# Patient Record
Sex: MALE | Race: WHITE | Employment: FULL TIME | URBAN - METROPOLITAN AREA
[De-identification: names, ages, dates, MRNs, and addresses within clinical notes are randomized per-mention and may not be internally consistent; named-entity substitution may affect disease eponyms.]

---

## 2022-08-22 ENCOUNTER — HOSPITAL ENCOUNTER (EMERGENCY)
Age: 57
Discharge: HOME | End: 2022-08-22

## 2022-08-22 ENCOUNTER — APPOINTMENT (OUTPATIENT)
Dept: GENERAL RADIOLOGY | Age: 57
End: 2022-08-22

## 2022-08-22 VITALS
HEART RATE: 71 BPM | OXYGEN SATURATION: 99 % | SYSTOLIC BLOOD PRESSURE: 134 MMHG | BODY MASS INDEX: 24.49 KG/M2 | WEIGHT: 197 LBS | DIASTOLIC BLOOD PRESSURE: 96 MMHG | TEMPERATURE: 98.3 F | RESPIRATION RATE: 18 BRPM | HEIGHT: 75 IN

## 2022-08-22 DIAGNOSIS — S61.012A LACERATION OF LEFT THUMB WITHOUT FOREIGN BODY WITHOUT DAMAGE TO NAIL, INITIAL ENCOUNTER: ICD-10-CM

## 2022-08-22 DIAGNOSIS — S61.213A LACERATION OF LEFT MIDDLE FINGER WITHOUT FOREIGN BODY WITHOUT DAMAGE TO NAIL, INITIAL ENCOUNTER: ICD-10-CM

## 2022-08-22 DIAGNOSIS — S61.311A LACERATION OF LEFT INDEX FINGER WITHOUT FOREIGN BODY WITH DAMAGE TO NAIL, INITIAL ENCOUNTER: Primary | ICD-10-CM

## 2022-08-22 PROCEDURE — 12002 RPR S/N/AX/GEN/TRNK2.6-7.5CM: CPT

## 2022-08-22 PROCEDURE — 99283 EMERGENCY DEPT VISIT LOW MDM: CPT

## 2022-08-22 PROCEDURE — 73120 X-RAY EXAM OF HAND: CPT

## 2022-08-22 RX ORDER — CEPHALEXIN 250 MG/1
250 CAPSULE ORAL 4 TIMES DAILY
Qty: 20 CAPSULE | Refills: 0 | Status: SHIPPED | OUTPATIENT
Start: 2022-08-22 | End: 2022-08-27

## 2022-08-22 ASSESSMENT — ENCOUNTER SYMPTOMS
PHOTOPHOBIA: 0
BACK PAIN: 0
COUGH: 0
ABDOMINAL PAIN: 0

## 2022-08-22 ASSESSMENT — PAIN DESCRIPTION - DESCRIPTORS: DESCRIPTORS: SORE

## 2022-08-22 ASSESSMENT — PAIN - FUNCTIONAL ASSESSMENT
PAIN_FUNCTIONAL_ASSESSMENT: 0-10
PAIN_FUNCTIONAL_ASSESSMENT: 0-10

## 2022-08-22 ASSESSMENT — PAIN DESCRIPTION - ORIENTATION: ORIENTATION: LEFT

## 2022-08-22 ASSESSMENT — PAIN DESCRIPTION - PAIN TYPE: TYPE: ACUTE PAIN

## 2022-08-22 ASSESSMENT — PAIN DESCRIPTION - FREQUENCY: FREQUENCY: INTERMITTENT

## 2022-08-22 ASSESSMENT — PAIN SCALES - GENERAL
PAINLEVEL_OUTOF10: 4
PAINLEVEL_OUTOF10: 0

## 2022-08-22 ASSESSMENT — PAIN DESCRIPTION - LOCATION: LOCATION: FINGER (COMMENT WHICH ONE)

## 2022-08-22 NOTE — ED PROVIDER NOTES
[facility] ED  eMERGENCY dEPARTMENT eNCOUnter      Pt Name: Chadd Grier  MRN: [id number]  [place] 1965  Date of evaluation: 8/22/2022  Provider: Dionna Paez PA-C      HISTORY OF PRESENT ILLNESS    HPI  Chadd rGier is a 59 y.o. male, who presents to the ED with CC of finger laceration to L 2nd and 3rd digits while working on a fan blade. He reports bleeding fairly controlled but believes he may need stitches. Denies loss of movement to any portions of fingers. UTD on tetanus immunization, per patient. REVIEW OF SYSTEMS     Review of Systems   Constitutional:  Negative for fever. HENT:  Negative for ear pain. Eyes:  Negative for photophobia. Respiratory:  Negative for cough. Cardiovascular:  Negative for chest pain. Gastrointestinal:  Negative for abdominal pain. Genitourinary:  Negative for flank pain. Musculoskeletal:  Negative for back pain. Skin:  Positive for wound. Neurological:  Negative for headaches. Hematological:  Negative for adenopathy. Psychiatric/Behavioral:  Negative for confusion. PAST MEDICAL HISTORY   History reviewed. No pertinent past medical history. SURGICAL HISTORY     History reviewed. No pertinent surgical history. CURRENT MEDICATIONS       Previous Medications    No medications on file       ALLERGIES     Patient has no known allergies. FAMILY HISTORY     History reviewed. No pertinent family history.      SOCIAL HISTORY       Social History     Socioeconomic History    Marital status: Single     Spouse name: None    Number of children: None    Years of education: None    Highest education level: None   Tobacco Use    Smoking status: Every Day     Types: Cigarettes    Smokeless tobacco: Never   Substance and Sexual Activity    Alcohol use: Yes    Drug use: Not Currently    Sexual activity: Not Currently       PHYSICAL EXAM       ED Triage Vitals [08/22/22 1529]   BP Temp Temp src Pulse Resp SpO2 Height Weight   (!) 134/96 -- -- -- 18 99 % 6' 3\" (1.905 m) 197 lb (89.4 kg)       Physical Exam  Vitals and nursing note reviewed. Constitutional:       Appearance: He is not toxic-appearing. HENT:      Head: Normocephalic and atraumatic. Eyes:      Pupils: Pupils are equal, round, and reactive to light. Cardiovascular:      Rate and Rhythm: Normal rate. Pulmonary:      Effort: Pulmonary effort is normal.   Abdominal:      General: There is no distension. Musculoskeletal:      Cervical back: Neck supple. Comments: DEANNE x4   Skin:     Capillary Refill: Capillary refill takes less than 2 seconds. Comments:   Wound #1: ~2 cm  curvilinear laceration to L 2nd digit on dorsal aspect over DIP to cuticle  Wound #2: ~1 cm curvilinear laceration to L 3rd digit on dorsal aspect near DIP  Wound #3: Subcentimeter linear laceration to thumb tip without gaping or active bleeding   Neurological:      General: No focal deficit present. Mental Status: He is alert and oriented to person, place, and time.    Psychiatric:         Mood and Affect: Mood normal.       Lac Repair    Date/Time: 8/22/2022 4:23 PM  Performed by: Cindy Hill PA-C  Authorized by: Cindy Hill PA-C     Consent:     Consent obtained:  Verbal    Consent given by:  Patient    Risks, benefits, and alternatives were discussed: yes      Risks discussed:  Infection, pain, tendon damage, vascular damage and poor wound healing    Alternatives discussed:  No treatment  Anesthesia:     Anesthesia method:  Local infiltration    Local anesthetic:  Lidocaine 1% w/o epi  Laceration details:     Location:  Finger    Finger location:  L index finger    Length (cm):  2  Pre-procedure details:     Preparation:  Patient was prepped and draped in usual sterile fashion  Exploration:     Hemostasis achieved with:  Direct pressure    Imaging obtained: x-ray      Imaging outcome: foreign body not noted      Wound exploration: wound explored through full range of motion and entire depth of wound visualized    Treatment:     Area cleansed with:  Chlorhexidine    Amount of cleaning:  Standard    Irrigation solution:  Sterile saline    Debridement:  Minimal    Undermining:  None  Skin repair:     Repair method:  Sutures    Suture size:  5-0    Suture material:  Nylon    Suture technique:  Simple interrupted    Number of sutures:  5  Approximation:     Approximation:  Close  Repair type:     Repair type:   Intermediate  Post-procedure details:     Dressing:  Splint for protection, bulky dressing and antibiotic ointment    Procedure completion:  Tolerated well, no immediate complications  Lac Repair    Date/Time: 8/22/2022 4:25 PM  Performed by: Dionna Paez PA-C  Authorized by: Dionna Paez PA-C     Consent:     Consent obtained:  Verbal    Consent given by:  Patient    Risks, benefits, and alternatives were discussed: yes      Risks discussed:  Infection, pain, tendon damage, vascular damage and poor wound healing    Alternatives discussed:  No treatment  Anesthesia:     Anesthesia method:  Local infiltration    Local anesthetic:  Lidocaine 1% w/o epi  Laceration details:     Location:  Finger    Finger location:  L long finger    Length (cm):  1.3  Pre-procedure details:     Preparation:  Patient was prepped and draped in usual sterile fashion  Exploration:     Hemostasis achieved with:  Direct pressure    Imaging obtained: x-ray      Imaging outcome: foreign body not noted      Wound exploration: wound explored through full range of motion and entire depth of wound visualized    Treatment:     Area cleansed with:  Chlorhexidine    Amount of cleaning:  Standard    Irrigation solution:  Sterile saline    Debridement:  None    Undermining:  None  Skin repair:     Repair method:  Sutures    Suture size:  5-0    Suture material:  Nylon    Suture technique:  Simple interrupted    Number of sutures:  3  Approximation:     Approximation:  Close  Repair type:     Repair type: Simple  Post-procedure details:     Dressing:  Antibiotic ointment, splint for protection and bulky dressing    Procedure completion:  Tolerated well, no immediate complications    MDM:   Vitals:    Vitals:    08/22/22 1529   BP: (!) 134/96   Resp: 18   SpO2: 99%   Weight: 197 lb (89.4 kg)   Height: 6' 3\" (1.905 m)       Patient presents to ED for complaint, as described above. Imaging obtained, negative for fracture or FB. Per patient, UTD on tetanus. Lac repair performed per procedure notes above. Keflex prescribed for infection prophylaxis. All questions answered at the time of visit. Standard anticipatory guidance given to patient upon discharge. I have given them a specific time frame in which to follow-up and who to follow-up with. I have also advised them that they should return to the emergency department if they get worse, or not getting better or develop any new or concerning symptoms. Patient demonstrates understanding. FINAL IMPRESSION      1. Laceration of left index finger without foreign body with damage to nail, initial encounter    2. Laceration of left middle finger without foreign body without damage to nail, initial encounter    3.  Laceration of left thumb without foreign body without damage to nail, initial encounter          DISPOSITION/PLAN   DISPOSITION Decision To Discharge 08/22/2022 04:18:50 PM      PATIENT REFERRED TO:  [address]  [phone]  In 2 days      DISCHARGE MEDICATIONS:  New Prescriptions    CEPHALEXIN (KEFLEX) 250 MG CAPSULE    Take 1 capsule by mouth 4 times daily for 5 days            Kaylee Colón PA-C (electronically signed)  Emergency Physician Assistant       Kaylee Colón PA-C  08/22/22 9198

## 2022-08-22 NOTE — ED NOTES
Wounds cleaned of blood, bandaides applied over sutured area. Finger splints applied to fingers and fingers wrapped with gauze. Pt oneil well.   Pt advised of continued care for sutured area and need for f/u in week for suture removal.     Clement Gray RN  08/22/22 0394

## 2023-08-14 ENCOUNTER — OFFICE VISIT (OUTPATIENT)
Dept: PRIMARY CARE | Facility: CLINIC | Age: 58
End: 2023-08-14
Payer: COMMERCIAL

## 2023-08-14 ENCOUNTER — TELEPHONE (OUTPATIENT)
Dept: PRIMARY CARE | Facility: CLINIC | Age: 58
End: 2023-08-14

## 2023-08-14 VITALS
WEIGHT: 190.5 LBS | BODY MASS INDEX: 23.69 KG/M2 | DIASTOLIC BLOOD PRESSURE: 62 MMHG | HEART RATE: 64 BPM | HEIGHT: 75 IN | SYSTOLIC BLOOD PRESSURE: 120 MMHG | OXYGEN SATURATION: 95 %

## 2023-08-14 DIAGNOSIS — R09.89 BILATERAL CAROTID BRUITS: Primary | ICD-10-CM

## 2023-08-14 DIAGNOSIS — J44.9 CHRONIC OBSTRUCTIVE PULMONARY DISEASE, UNSPECIFIED COPD TYPE (MULTI): ICD-10-CM

## 2023-08-14 DIAGNOSIS — F17.298 OTHER TOBACCO PRODUCT NICOTINE DEPENDENCE WITH OTHER NICOTINE-INDUCED DISORDER: ICD-10-CM

## 2023-08-14 DIAGNOSIS — E78.2 HYPERLIPEMIA, MIXED: ICD-10-CM

## 2023-08-14 PROBLEM — F17.200 NICOTINE DEPENDENCE: Status: ACTIVE | Noted: 2023-08-14

## 2023-08-14 PROCEDURE — 99214 OFFICE O/P EST MOD 30 MIN: CPT | Performed by: NURSE PRACTITIONER

## 2023-08-14 PROCEDURE — 4004F PT TOBACCO SCREEN RCVD TLK: CPT | Performed by: NURSE PRACTITIONER

## 2023-08-14 PROCEDURE — 96127 BRIEF EMOTIONAL/BEHAV ASSMT: CPT | Performed by: NURSE PRACTITIONER

## 2023-08-14 RX ORDER — TIOTROPIUM BROMIDE AND OLODATEROL 3.124; 2.736 UG/1; UG/1
2 SPRAY, METERED RESPIRATORY (INHALATION) DAILY
Qty: 1 G | Refills: 11 | Status: SHIPPED | OUTPATIENT
Start: 2023-08-14

## 2023-08-14 RX ORDER — FLUTICASONE FUROATE AND VILANTEROL 200; 25 UG/1; UG/1
1 POWDER RESPIRATORY (INHALATION) DAILY
COMMUNITY
Start: 2021-03-09 | End: 2023-08-14 | Stop reason: SDUPTHER

## 2023-08-14 RX ORDER — FLUTICASONE FUROATE AND VILANTEROL 200; 25 UG/1; UG/1
1 POWDER RESPIRATORY (INHALATION) DAILY
Qty: 1 EACH | Refills: 11 | Status: SHIPPED | OUTPATIENT
Start: 2023-08-14 | End: 2023-08-14

## 2023-08-14 RX ORDER — ATORVASTATIN CALCIUM 10 MG/1
10 TABLET, FILM COATED ORAL DAILY
Qty: 30 TABLET | Refills: 11 | Status: SHIPPED | OUTPATIENT
Start: 2023-08-14 | End: 2024-08-13

## 2023-08-14 ASSESSMENT — ENCOUNTER SYMPTOMS
FEVER: 0
PALPITATIONS: 0
LIGHT-HEADEDNESS: 0
FATIGUE: 0
WHEEZING: 0
HEADACHES: 1
SHORTNESS OF BREATH: 1
UNEXPECTED WEIGHT CHANGE: 0
BRUISES/BLEEDS EASILY: 1
ABDOMINAL PAIN: 0
DIZZINESS: 0
COUGH: 1
CHILLS: 0

## 2023-08-14 ASSESSMENT — PATIENT HEALTH QUESTIONNAIRE - PHQ9
2. FEELING DOWN, DEPRESSED OR HOPELESS: NOT AT ALL
SUM OF ALL RESPONSES TO PHQ9 QUESTIONS 1 AND 2: 0
1. LITTLE INTEREST OR PLEASURE IN DOING THINGS: NOT AT ALL

## 2023-08-14 NOTE — PROGRESS NOTES
"Subjective   Patient ID: Mo Cotton is a 58 y.o. male who presents for Med Refill (Inhaler refill ).    Mo comes to the office for a refill on his inhaler.  Last seen in office 2021.  States lost his job and insurance until recently.  PHQ 2 score: 0  + COPD. Works in [employer] and breathing more problematic over last yr. Tobacco use: cigars 3/week.  He is going to try cutting back and reducing the amount of cigars he is smoking weekly.  Cough productive (more @ night) & difficult to expectorate. More SOB. No wheezing.   Continues to take ASA 81mg, but when lost insurance he ran out of his a atorvastatin.  History of R external carotid artery stenosis; was recommended to have a recheck of carotid Dopplers in 1 year.  At  Declines colorectal cancer screening at this visit  I have counselled patient about need for smoking/tobacco cessation and how I can support efforts when patient is ready to quit. Patient currently has signs or symptoms of tobacco related disease.              Review of Systems   Constitutional:  Negative for chills, fatigue, fever and unexpected weight change.   Respiratory:  Positive for cough and shortness of breath. Negative for wheezing.    Cardiovascular:  Negative for chest pain, palpitations and leg swelling.   Gastrointestinal:  Negative for abdominal pain.   Neurological:  Positive for headaches. Negative for dizziness, syncope and light-headedness.   Hematological:  Bruises/bleeds easily.       Objective   /62   Pulse 64   Ht 1.905 m (6' 3\")   Wt 86.4 kg (190 lb 8 oz)   SpO2 95%   BMI 23.81 kg/m²     Physical Exam  Vitals and nursing note reviewed.   Constitutional:       Appearance: Normal appearance.   HENT:      Head: Normocephalic.   Neck:      Vascular: Carotid bruit present.   Cardiovascular:      Rate and Rhythm: Normal rate and regular rhythm.      Heart sounds: Normal heart sounds.   Pulmonary:      Effort: Pulmonary effort is normal.      Breath sounds: Decreased air " movement present.   Abdominal:      General: Abdomen is flat. Bowel sounds are normal.      Palpations: Abdomen is soft.      Tenderness: There is no abdominal tenderness.   Musculoskeletal:      Right lower leg: No edema.      Left lower leg: No edema.   Skin:     General: Skin is warm and dry.   Neurological:      General: No focal deficit present.      Mental Status: He is alert and oriented to person, place, and time.   Psychiatric:         Mood and Affect: Mood normal.         Thought Content: Thought content normal.         Assessment/Plan   Problem List Items Addressed This Visit       Bilateral carotid bruits - Primary    Relevant Orders    Vascular US carotid artery duplex bilateral    Follow Up In Primary Care - Established    COPD (chronic obstructive pulmonary disease) (CMS/[facility])    Relevant Medications    tiotropium-olodateroL (Stiolto Respimat) 2.5-2.5 mcg/actuation mist inhaler    Other Relevant Orders    Follow Up In Primary Care - Established    Hyperlipemia, mixed    Relevant Medications    atorvastatin (Lipitor) 10 mg tablet    Other Relevant Orders    Lipid Panel    CBC    Comprehensive Metabolic Panel    Follow Up In Primary Care - Established    Nicotine dependence

## 2023-08-14 NOTE — PATIENT INSTRUCTIONS
Office visit in 6 weeks  Complete blood work fasting within the next 1 to 2 weeks  Continue aspirin  ReStart atorvastatin 10 mg daily  Add Mucinex  mg daily  Add stiolto 2.5mcg inhaler for COPD  Recommend cutting back on amount of alcohol and nicotine

## 2023-08-25 ENCOUNTER — TELEPHONE (OUTPATIENT)
Dept: PRIMARY CARE | Facility: CLINIC | Age: 58
End: 2023-08-25

## 2023-08-25 ENCOUNTER — LAB (OUTPATIENT)
Dept: LAB | Facility: LAB | Age: 58
End: 2023-08-25
Payer: COMMERCIAL

## 2023-08-25 DIAGNOSIS — E78.2 HYPERLIPEMIA, MIXED: ICD-10-CM

## 2023-08-25 LAB
ALANINE AMINOTRANSFERASE (SGPT) (U/L) IN SER/PLAS: 20 U/L (ref 10–52)
ALBUMIN (G/DL) IN SER/PLAS: 3.9 G/DL (ref 3.4–5)
ALKALINE PHOSPHATASE (U/L) IN SER/PLAS: 89 U/L (ref 33–136)
ANION GAP IN SER/PLAS: 9 MMOL/L (ref 10–20)
ASPARTATE AMINOTRANSFERASE (SGOT) (U/L) IN SER/PLAS: 13 U/L (ref 9–39)
BILIRUBIN TOTAL (MG/DL) IN SER/PLAS: 0.6 MG/DL (ref 0–1.2)
CALCIUM (MG/DL) IN SER/PLAS: 9 MG/DL (ref 8.6–10.3)
CARBON DIOXIDE, TOTAL (MMOL/L) IN SER/PLAS: 26 MMOL/L (ref 21–32)
CHLORIDE (MMOL/L) IN SER/PLAS: 108 MMOL/L (ref 98–107)
CHOLESTEROL (MG/DL) IN SER/PLAS: 153 MG/DL (ref 0–199)
CHOLESTEROL IN HDL (MG/DL) IN SER/PLAS: 63 MG/DL
CHOLESTEROL/HDL RATIO: 2.4
CREATININE (MG/DL) IN SER/PLAS: 0.7 MG/DL (ref 0.5–1.3)
ERYTHROCYTE DISTRIBUTION WIDTH (RATIO) BY AUTOMATED COUNT: 12.4 % (ref 11.5–14.5)
ERYTHROCYTE MEAN CORPUSCULAR HEMOGLOBIN CONCENTRATION (G/DL) BY AUTOMATED: 32.2 G/DL (ref 32–36)
ERYTHROCYTE MEAN CORPUSCULAR VOLUME (FL) BY AUTOMATED COUNT: 95 FL (ref 80–100)
ERYTHROCYTES (10*6/UL) IN BLOOD BY AUTOMATED COUNT: 5.21 X10E12/L (ref 4.5–5.9)
GFR MALE: >90 ML/MIN/1.73M2
GLUCOSE (MG/DL) IN SER/PLAS: 98 MG/DL (ref 74–99)
HEMATOCRIT (%) IN BLOOD BY AUTOMATED COUNT: 49.7 % (ref 41–52)
HEMOGLOBIN (G/DL) IN BLOOD: 16 G/DL (ref 13.5–17.5)
LDL: 72 MG/DL (ref 0–99)
LEUKOCYTES (10*3/UL) IN BLOOD BY AUTOMATED COUNT: 11.6 X10E9/L (ref 4.4–11.3)
PLATELETS (10*3/UL) IN BLOOD AUTOMATED COUNT: 232 X10E9/L (ref 150–450)
POTASSIUM (MMOL/L) IN SER/PLAS: 4.4 MMOL/L (ref 3.5–5.3)
PROTEIN TOTAL: 6 G/DL (ref 6.4–8.2)
SODIUM (MMOL/L) IN SER/PLAS: 139 MMOL/L (ref 136–145)
TRIGLYCERIDE (MG/DL) IN SER/PLAS: 88 MG/DL (ref 0–149)
UREA NITROGEN (MG/DL) IN SER/PLAS: 15 MG/DL (ref 6–23)
VLDL: 18 MG/DL (ref 0–40)

## 2023-08-25 PROCEDURE — 36415 COLL VENOUS BLD VENIPUNCTURE: CPT

## 2023-08-25 PROCEDURE — 80053 COMPREHEN METABOLIC PANEL: CPT

## 2023-08-25 PROCEDURE — 85027 COMPLETE CBC AUTOMATED: CPT

## 2023-08-25 PROCEDURE — 80061 LIPID PANEL: CPT

## 2023-08-28 ENCOUNTER — TELEPHONE (OUTPATIENT)
Dept: PRIMARY CARE | Facility: CLINIC | Age: 58
End: 2023-08-28
Payer: COMMERCIAL

## 2023-09-18 ENCOUNTER — OFFICE VISIT (OUTPATIENT)
Dept: PRIMARY CARE | Facility: CLINIC | Age: 58
End: 2023-09-18
Payer: COMMERCIAL

## 2023-09-18 ENCOUNTER — TELEPHONE (OUTPATIENT)
Dept: PRIMARY CARE | Facility: CLINIC | Age: 58
End: 2023-09-18

## 2023-09-18 VITALS
DIASTOLIC BLOOD PRESSURE: 90 MMHG | SYSTOLIC BLOOD PRESSURE: 130 MMHG | OXYGEN SATURATION: 95 % | HEIGHT: 75 IN | BODY MASS INDEX: 23.56 KG/M2 | HEART RATE: 74 BPM | WEIGHT: 189.5 LBS

## 2023-09-18 DIAGNOSIS — J44.9 CHRONIC OBSTRUCTIVE PULMONARY DISEASE, UNSPECIFIED COPD TYPE (MULTI): ICD-10-CM

## 2023-09-18 DIAGNOSIS — R09.89 BILATERAL CAROTID BRUITS: ICD-10-CM

## 2023-09-18 DIAGNOSIS — E78.2 HYPERLIPEMIA, MIXED: ICD-10-CM

## 2023-09-18 DIAGNOSIS — Z12.5 SCREENING FOR PROSTATE CANCER: Primary | ICD-10-CM

## 2023-09-18 PROCEDURE — 4004F PT TOBACCO SCREEN RCVD TLK: CPT | Performed by: NURSE PRACTITIONER

## 2023-09-18 PROCEDURE — 99214 OFFICE O/P EST MOD 30 MIN: CPT | Performed by: NURSE PRACTITIONER

## 2023-09-18 ASSESSMENT — PATIENT HEALTH QUESTIONNAIRE - PHQ9
SUM OF ALL RESPONSES TO PHQ9 QUESTIONS 1 AND 2: 0
1. LITTLE INTEREST OR PLEASURE IN DOING THINGS: NOT AT ALL
2. FEELING DOWN, DEPRESSED OR HOPELESS: NOT AT ALL

## 2023-09-18 ASSESSMENT — ENCOUNTER SYMPTOMS
DIARRHEA: 0
DIFFICULTY URINATING: 0
ABDOMINAL PAIN: 0
HEMATURIA: 0
NAUSEA: 0
FREQUENCY: 0
SHORTNESS OF BREATH: 1
PALPITATIONS: 0
DYSURIA: 0
CONSTIPATION: 0
VOMITING: 0
CHEST TIGHTNESS: 0
WHEEZING: 0
NERVOUS/ANXIOUS: 0
COUGH: 1
BLOOD IN STOOL: 0

## 2023-09-18 NOTE — PROGRESS NOTES
"Subjective   Patient ID: Mo Cotton is a 58 y.o. male who presents for 6 wk fu.    Mo comes to the office for a 6-week recheck on dyslipidemia/and COPD.  + COPD. Works in [employer] and breathing more problematic over last yr. Tobacco use: cigarettes 8-10/daily.  He is trying to cut back and reducing the amount of cigarettes. No wheezing. Added Stiolto & less cough. breathing is easier.   Continues to take ASA 81mg, cont. atorvastatin.  LDL at goal  Labs reviewed w/ pt today  History of R external carotid artery stenosis; carotid doppler shows no chg.  Recheck 1 year  Declines colorectal cancer screening at this visit; no family history of CRC CA.  No issues with bowels.  Reviewed benefits to early colorectal cancer screening detection.  Patient is aware and voiced understanding.  If changes mind prior to his next office visit to complete colorectal cancer screening has been encouraged to call the office to arrange for testing to be completed.  I have counselled patient about need for smoking/tobacco cessation and how I can support efforts when patient is ready to quit. Patient currently has signs or symptoms of tobacco related disease.            Review of Systems   Respiratory:  Positive for cough and shortness of breath. Negative for chest tightness and wheezing.    Cardiovascular:  Negative for chest pain, palpitations and leg swelling.   Gastrointestinal:  Negative for abdominal pain, blood in stool, constipation, diarrhea, nausea and vomiting.   Genitourinary:  Negative for difficulty urinating, dysuria, frequency and hematuria.   Psychiatric/Behavioral:  The patient is not nervous/anxious.        Objective   /90   Pulse 74   Ht 1.905 m (6' 3\")   Wt 86 kg (189 lb 8 oz)   SpO2 95%   BMI 23.69 kg/m²     Physical Exam  Vitals and nursing note reviewed.   Constitutional:       Appearance: Normal appearance.   HENT:      Head: Normocephalic.   Neck:      Thyroid: No thyromegaly.   Cardiovascular:      " Rate and Rhythm: Normal rate and regular rhythm.      Heart sounds: Normal heart sounds.   Pulmonary:      Effort: Pulmonary effort is normal.      Breath sounds: Normal breath sounds.   Musculoskeletal:      Cervical back: Normal range of motion.      Right lower leg: No edema.      Left lower leg: No edema.   Skin:     General: Skin is warm and dry.   Neurological:      General: No focal deficit present.      Mental Status: He is alert and oriented to person, place, and time.   Psychiatric:         Mood and Affect: Mood normal.         Thought Content: Thought content normal.         Assessment/Plan   Problem List Items Addressed This Visit       Bilateral carotid bruits    Relevant Orders    Follow Up In Primary Care - Established    COPD (chronic obstructive pulmonary disease) (CMS/HCC)    Relevant Orders    Follow Up In Primary Care - Established    Hyperlipemia, mixed    Relevant Orders    CBC    Comprehensive Metabolic Panel    Lipid Panel    Follow Up In Primary Care - Established    Screening for prostate cancer - Primary    Relevant Orders    Prostate Specific Antigen, Screen

## 2024-04-29 ENCOUNTER — LAB (OUTPATIENT)
Dept: LAB | Facility: LAB | Age: 59
End: 2024-04-29
Payer: COMMERCIAL

## 2024-04-29 ENCOUNTER — DOCUMENTATION (OUTPATIENT)
Dept: SURGERY | Facility: CLINIC | Age: 59
End: 2024-04-29

## 2024-04-29 ENCOUNTER — OFFICE VISIT (OUTPATIENT)
Dept: PRIMARY CARE | Facility: CLINIC | Age: 59
End: 2024-04-29
Payer: COMMERCIAL

## 2024-04-29 VITALS
HEART RATE: 65 BPM | SYSTOLIC BLOOD PRESSURE: 116 MMHG | HEIGHT: 74 IN | BODY MASS INDEX: 24.07 KG/M2 | DIASTOLIC BLOOD PRESSURE: 64 MMHG | WEIGHT: 187.6 LBS | OXYGEN SATURATION: 98 %

## 2024-04-29 DIAGNOSIS — J43.9 PULMONARY EMPHYSEMA, UNSPECIFIED EMPHYSEMA TYPE (MULTI): ICD-10-CM

## 2024-04-29 DIAGNOSIS — T14.8XXA BRUISING: Primary | ICD-10-CM

## 2024-04-29 DIAGNOSIS — T14.8XXA BRUISING: ICD-10-CM

## 2024-04-29 DIAGNOSIS — Z12.5 SCREENING FOR PROSTATE CANCER: ICD-10-CM

## 2024-04-29 DIAGNOSIS — L98.9 SKIN LESION: ICD-10-CM

## 2024-04-29 DIAGNOSIS — Z12.11 SCREENING FOR MALIGNANT NEOPLASM OF COLON: ICD-10-CM

## 2024-04-29 DIAGNOSIS — E78.2 HYPERLIPEMIA, MIXED: ICD-10-CM

## 2024-04-29 LAB
ALBUMIN SERPL BCP-MCNC: 4 G/DL (ref 3.4–5)
ALP SERPL-CCNC: 93 U/L (ref 33–136)
ALT SERPL W P-5'-P-CCNC: 24 U/L (ref 10–52)
ANION GAP SERPL CALC-SCNC: 12 MMOL/L (ref 10–20)
AST SERPL W P-5'-P-CCNC: 14 U/L (ref 9–39)
BASOPHILS # BLD AUTO: 0.05 X10*3/UL (ref 0–0.1)
BASOPHILS NFR BLD AUTO: 0.5 %
BILIRUB SERPL-MCNC: 0.6 MG/DL (ref 0–1.2)
BUN SERPL-MCNC: 14 MG/DL (ref 6–23)
CALCIUM SERPL-MCNC: 9.1 MG/DL (ref 8.6–10.3)
CHLORIDE SERPL-SCNC: 107 MMOL/L (ref 98–107)
CHOLEST SERPL-MCNC: 203 MG/DL (ref 0–199)
CHOLESTEROL/HDL RATIO: 3.5
CO2 SERPL-SCNC: 23 MMOL/L (ref 21–32)
CREAT SERPL-MCNC: 0.78 MG/DL (ref 0.5–1.3)
EGFRCR SERPLBLD CKD-EPI 2021: >90 ML/MIN/1.73M*2
EOSINOPHIL # BLD AUTO: 0.22 X10*3/UL (ref 0–0.7)
EOSINOPHIL NFR BLD AUTO: 2 %
ERYTHROCYTE [DISTWIDTH] IN BLOOD BY AUTOMATED COUNT: 12.7 % (ref 11.5–14.5)
GLUCOSE SERPL-MCNC: 92 MG/DL (ref 74–99)
HCT VFR BLD AUTO: 51.8 % (ref 41–52)
HDLC SERPL-MCNC: 58 MG/DL
HGB BLD-MCNC: 16.4 G/DL (ref 13.5–17.5)
IMM GRANULOCYTES # BLD AUTO: 0.11 X10*3/UL (ref 0–0.7)
IMM GRANULOCYTES NFR BLD AUTO: 1 % (ref 0–0.9)
LDLC SERPL CALC-MCNC: 128 MG/DL
LYMPHOCYTES # BLD AUTO: 3.17 X10*3/UL (ref 1.2–4.8)
LYMPHOCYTES NFR BLD AUTO: 28.6 %
MCH RBC QN AUTO: 31 PG (ref 26–34)
MCHC RBC AUTO-ENTMCNC: 31.7 G/DL (ref 32–36)
MCV RBC AUTO: 98 FL (ref 80–100)
MONOCYTES # BLD AUTO: 0.78 X10*3/UL (ref 0.1–1)
MONOCYTES NFR BLD AUTO: 7 %
NEUTROPHILS # BLD AUTO: 6.77 X10*3/UL (ref 1.2–7.7)
NEUTROPHILS NFR BLD AUTO: 60.9 %
NON HDL CHOLESTEROL: 145 MG/DL (ref 0–149)
NRBC BLD-RTO: 0 /100 WBCS (ref 0–0)
PLATELET # BLD AUTO: 247 X10*3/UL (ref 150–450)
POTASSIUM SERPL-SCNC: 4.5 MMOL/L (ref 3.5–5.3)
PROT SERPL-MCNC: 6.1 G/DL (ref 6.4–8.2)
PSA SERPL-MCNC: 0.71 NG/ML
RBC # BLD AUTO: 5.29 X10*6/UL (ref 4.5–5.9)
SODIUM SERPL-SCNC: 137 MMOL/L (ref 136–145)
TRIGL SERPL-MCNC: 85 MG/DL (ref 0–149)
VLDL: 17 MG/DL (ref 0–40)
WBC # BLD AUTO: 11.1 X10*3/UL (ref 4.4–11.3)

## 2024-04-29 PROCEDURE — 80061 LIPID PANEL: CPT

## 2024-04-29 PROCEDURE — 99214 OFFICE O/P EST MOD 30 MIN: CPT | Performed by: NURSE PRACTITIONER

## 2024-04-29 PROCEDURE — 85025 COMPLETE CBC W/AUTO DIFF WBC: CPT

## 2024-04-29 PROCEDURE — 84153 ASSAY OF PSA TOTAL: CPT

## 2024-04-29 PROCEDURE — 80053 COMPREHEN METABOLIC PANEL: CPT

## 2024-04-29 PROCEDURE — 36415 COLL VENOUS BLD VENIPUNCTURE: CPT

## 2024-04-29 RX ORDER — FLUTICASONE FUROATE, UMECLIDINIUM BROMIDE AND VILANTEROL TRIFENATATE 100; 62.5; 25 UG/1; UG/1; UG/1
1 POWDER RESPIRATORY (INHALATION) DAILY
Qty: 30 EACH | Refills: 11 | Status: SHIPPED | OUTPATIENT
Start: 2024-04-29 | End: 2025-04-29

## 2024-04-29 ASSESSMENT — ENCOUNTER SYMPTOMS
ADENOPATHY: 0
BRUISES/BLEEDS EASILY: 1
SHORTNESS OF BREATH: 1
NAUSEA: 0
COUGH: 1
CONSTIPATION: 1
VOMITING: 0
BLOOD IN STOOL: 0
ABDOMINAL PAIN: 0

## 2024-04-29 ASSESSMENT — PATIENT HEALTH QUESTIONNAIRE - PHQ9
2. FEELING DOWN, DEPRESSED OR HOPELESS: NOT AT ALL
1. LITTLE INTEREST OR PLEASURE IN DOING THINGS: NOT AT ALL
SUM OF ALL RESPONSES TO PHQ9 QUESTIONS 1 AND 2: 0

## 2024-04-29 NOTE — PROGRESS NOTES
"Subjective   Patient ID: Mo Cotton is a 59 y.o. male who presents for Hemorrhoids (External hemorrhoid ).    Mo comes to the office for concern of an external hemorrhoid.  Indicates he ate a bunch of cheese 3 months ago and noticed afterwards some considerable constipation.  He had a hard time passing his stools and he felt a large knot in his rectal region which has since improved with using Preparation H.  He no longer suffers from constipation and has no pain with defecation.  He used no laxatives or stool softeners when he was constipated.    + FXHX CRC CA in Brother.  We will schedule patient for a screening colonoscopy  He is concerned as he has been noticing he is gradually bruising easier over the course of the last year to both of his hands and lower forearms.  He works in [employer] and is constantly banging his hands against the machinery.  He endorses no ecchymosis to the chest abdomen or genitals.  No nosebleeds or bleeding gums.  He is getting ready to have dentures put in but he endorses no family history of bleeding or clotting disorders.    Concerned his COPD may be worsening as he becomes fatigued more quickly.  Will try changing to Trelegy 1 puff daily.     Mole R side of head above R ea x 1year. No pain/color or size changes.  He endorses he has returned to smoking cigarettes daily         Review of Systems   HENT:  Negative for nosebleeds.         No bleeding gums   Respiratory:  Positive for cough and shortness of breath.    Gastrointestinal:  Positive for constipation. Negative for abdominal pain, blood in stool, nausea and vomiting.   Skin:         Skin lesion above right ear   Hematological:  Negative for adenopathy. Bruises/bleeds easily.       Objective   /64   Pulse 65   Ht 1.88 m (6' 2\")   Wt 85.1 kg (187 lb 9.6 oz)   SpO2 98%   BMI 24.09 kg/m²     Physical Exam  Abdominal:      General: Abdomen is flat. Bowel sounds are normal.      Palpations: Abdomen is soft.      " Tenderness: There is no abdominal tenderness.      Comments: No external hemorrhoid  Digital exam with no internal hemorrhoid palpated  Has a small hardened nodular spot near rectum, no fissure   Skin:     Findings: Bruising and ecchymosis present.      Comments: Bruises noted to top of both hands and lower forearms  Mole above R ear irregular border, brown in color, w/o drainage         Assessment/Plan   Problem List Items Addressed This Visit             ICD-10-CM    COPD (chronic obstructive pulmonary disease) (Multi) J44.9    Relevant Medications    fluticasone-umeclidin-vilanter (Trelegy Ellipta) 100-62.5-25 mcg blister with device     Other Visit Diagnoses         Codes    Bruising    -  Primary T14.8XXA    ck cbc     Relevant Orders    CBC and Auto Differential    Screening for malignant neoplasm of colon     Z12.11    Referral for colonoscopy     Relevant Orders    Referral to Gastroenterology    Skin lesion     L98.9    Referral to dermatology     Relevant Orders    Referral to Dermatology

## 2024-04-29 NOTE — PROGRESS NOTES
Received open access form from  Mildred Wood . Scheduled on   5-13-24 . Pt denies rectal bleeding, diarrhea and constipation.Pt states his external hemorrhoid and constipation is all cleared.  Brother had  hx of colon cancer. No hx of colonoscopy,     R/s to 5-20-24 to  scheduled.

## 2024-04-29 NOTE — PATIENT INSTRUCTIONS
Stop Stiolto and changed to Trelegy 1 puff daily for your COPD.  I recommend you stop smoking which will help with your COPD symptoms  We will refer you to general surgery to complete your screening colonoscopy.  Try to eat a diet higher in fiber such as fruits and vegetables to prevent constipation or hardening of your stools.  We will refer you to dermatology to address the skin lesion above your right ear

## 2024-04-30 ENCOUNTER — TELEPHONE (OUTPATIENT)
Dept: PRIMARY CARE | Facility: CLINIC | Age: 59
End: 2024-04-30
Payer: COMMERCIAL

## 2024-04-30 NOTE — RESULT ENCOUNTER NOTE
Notify patient prostate blood test is normal.  His bloodwork shows no signs of bleeding or clotting problem. Bruising on hands & lower forearms likely caused from job.  cholesterol is elevated. Check to make sure Mo is taking his Lipitor. Confirm he has plenty of refills available if needed. If not taking, he needs to restart. If taking medication, he needs to make sure he is eating a high fiber diet (fruits/vegetables) and minimize amount of red meat he is eating

## 2024-04-30 NOTE — TELEPHONE ENCOUNTER
PT NOTIFIED, STATES HE HAS BEEN SLACKING ON TAKING LIPITOR, ENCOURAGED HIM TO RESTART IT , WENT OVER HIGH FIBER DIET CHOICES WITH HIM, VERBALIZED UNDERSTANDING

## 2024-04-30 NOTE — TELEPHONE ENCOUNTER
----- Message from AMY Freeman sent at 4/30/2024  8:47 AM EDT -----  Notify patient prostate blood test is normal.  His bloodwork shows no signs of bleeding or clotting problem. Bruising on hands & lower forearms likely caused from job.  cholesterol is elevated. Check to make sure Mo is taking his Lipitor. Confirm he has plenty of refills available if needed. If not taking, he needs to restart. If taking medication, he needs to make sure he is eating a high fiber diet (fruits/veget  saeed) and minimize amount of red meat he is eating

## 2024-05-02 DIAGNOSIS — Z12.11 SCREEN FOR COLON CANCER: Primary | ICD-10-CM

## 2024-05-02 DIAGNOSIS — Z80.0 FAMILY HISTORY OF COLON CANCER: ICD-10-CM

## 2024-05-15 VITALS — HEIGHT: 75 IN | WEIGHT: 190 LBS | BODY MASS INDEX: 23.62 KG/M2

## 2024-05-20 ENCOUNTER — HOSPITAL ENCOUNTER (OUTPATIENT)
Dept: GASTROENTEROLOGY | Facility: CLINIC | Age: 59
Setting detail: OUTPATIENT SURGERY
Discharge: HOME | End: 2024-05-20
Payer: COMMERCIAL

## 2024-05-20 ENCOUNTER — PREP FOR PROCEDURE (OUTPATIENT)
Dept: GASTROENTEROLOGY | Facility: HOSPITAL | Age: 59
End: 2024-05-20

## 2024-05-20 VITALS
TEMPERATURE: 97 F | WEIGHT: 183.8 LBS | OXYGEN SATURATION: 97 % | RESPIRATION RATE: 16 BRPM | SYSTOLIC BLOOD PRESSURE: 121 MMHG | BODY MASS INDEX: 23.59 KG/M2 | HEART RATE: 79 BPM | HEIGHT: 74 IN | DIASTOLIC BLOOD PRESSURE: 81 MMHG

## 2024-05-20 DIAGNOSIS — Z80.0 FAMILY HISTORY OF COLON CANCER: ICD-10-CM

## 2024-05-20 DIAGNOSIS — Z12.11 SCREEN FOR COLON CANCER: ICD-10-CM

## 2024-05-20 DIAGNOSIS — Z12.11 SCREENING FOR MALIGNANT NEOPLASM OF COLON: Primary | ICD-10-CM

## 2024-05-20 PROCEDURE — G0105 COLORECTAL SCRN; HI RISK IND: HCPCS | Performed by: SURGERY

## 2024-05-20 PROCEDURE — 7100000009 HC PHASE TWO TIME - INITIAL BASE CHARGE

## 2024-05-20 PROCEDURE — 2500000004 HC RX 250 GENERAL PHARMACY W/ HCPCS (ALT 636 FOR OP/ED): Performed by: SURGERY

## 2024-05-20 PROCEDURE — 7100000010 HC PHASE TWO TIME - EACH INCREMENTAL 1 MINUTE

## 2024-05-20 PROCEDURE — 45378 DIAGNOSTIC COLONOSCOPY: CPT | Performed by: SURGERY

## 2024-05-20 PROCEDURE — 3700000012 HC SEDATION LEVEL 5+ TIME - INITIAL 15 MINUTES 5/> YEARS

## 2024-05-20 RX ORDER — SODIUM CHLORIDE 9 MG/ML
100 INJECTION, SOLUTION INTRAVENOUS CONTINUOUS
Status: DISCONTINUED | OUTPATIENT
Start: 2024-05-20 | End: 2024-05-21 | Stop reason: HOSPADM

## 2024-05-20 RX ORDER — MIDAZOLAM HYDROCHLORIDE 1 MG/ML
INJECTION, SOLUTION INTRAMUSCULAR; INTRAVENOUS AS NEEDED
Status: COMPLETED | OUTPATIENT
Start: 2024-05-20 | End: 2024-05-20

## 2024-05-20 RX ORDER — SODIUM CHLORIDE 9 MG/ML
100 INJECTION, SOLUTION INTRAVENOUS CONTINUOUS
Status: CANCELLED | OUTPATIENT
Start: 2024-05-20

## 2024-05-20 RX ORDER — FENTANYL CITRATE 50 UG/ML
INJECTION, SOLUTION INTRAMUSCULAR; INTRAVENOUS AS NEEDED
Status: COMPLETED | OUTPATIENT
Start: 2024-05-20 | End: 2024-05-20

## 2024-05-20 RX ORDER — ONDANSETRON HYDROCHLORIDE 2 MG/ML
4 INJECTION, SOLUTION INTRAVENOUS ONCE AS NEEDED
Status: DISCONTINUED | OUTPATIENT
Start: 2024-05-20 | End: 2024-05-21 | Stop reason: HOSPADM

## 2024-05-20 RX ORDER — ONDANSETRON HYDROCHLORIDE 2 MG/ML
4 INJECTION, SOLUTION INTRAVENOUS ONCE AS NEEDED
Status: CANCELLED | OUTPATIENT
Start: 2024-05-20

## 2024-05-20 RX ADMIN — SODIUM CHLORIDE 100 ML/HR: 9 INJECTION, SOLUTION INTRAVENOUS at 07:15

## 2024-05-20 RX ADMIN — MIDAZOLAM 3 MG: 1 INJECTION INTRAMUSCULAR; INTRAVENOUS at 07:37

## 2024-05-20 RX ADMIN — MIDAZOLAM 2 MG: 1 INJECTION INTRAMUSCULAR; INTRAVENOUS at 07:43

## 2024-05-20 RX ADMIN — FENTANYL CITRATE 50 MCG: 50 INJECTION, SOLUTION INTRAMUSCULAR; INTRAVENOUS at 07:36

## 2024-05-20 RX ADMIN — FENTANYL CITRATE 25 MCG: 50 INJECTION, SOLUTION INTRAMUSCULAR; INTRAVENOUS at 07:43

## 2024-05-20 ASSESSMENT — PAIN SCALES - GENERAL
PAINLEVEL_OUTOF10: 0 - NO PAIN
PAINLEVEL_OUTOF10: 1
PAINLEVEL_OUTOF10: 0 - NO PAIN
PAINLEVEL_OUTOF10: 0 - NO PAIN

## 2024-05-20 ASSESSMENT — COLUMBIA-SUICIDE SEVERITY RATING SCALE - C-SSRS
6. HAVE YOU EVER DONE ANYTHING, STARTED TO DO ANYTHING, OR PREPARED TO DO ANYTHING TO END YOUR LIFE?: NO
2. HAVE YOU ACTUALLY HAD ANY THOUGHTS OF KILLING YOURSELF?: NO
1. IN THE PAST MONTH, HAVE YOU WISHED YOU WERE DEAD OR WISHED YOU COULD GO TO SLEEP AND NOT WAKE UP?: NO

## 2024-05-20 ASSESSMENT — PAIN - FUNCTIONAL ASSESSMENT
PAIN_FUNCTIONAL_ASSESSMENT: 0-10

## 2024-05-20 NOTE — H&P
"History Of Present Illness  Mo Cotton is a 59 y.o. male presenting with referral for screening colonoscopy.  The patient has had no change in his bowel habits blood in his stool or unexplained abdominal pain.  His brother was recently diagnosed with rectal cancer.  He has never had a colonoscopy.  His prep went fine..     Past Medical History  Past Medical History:   Diagnosis Date    COPD (chronic obstructive pulmonary disease) (Multi)     Elevated cholesterol      Surgical History  Past Surgical History:   Procedure Laterality Date    OTHER SURGICAL HISTORY  02/02/2021    Ankle surgery     Social History  He reports that he has been smoking cigars and cigarettes. He has been exposed to tobacco smoke. He has never used smokeless tobacco. He reports current alcohol use of about 4.0 standard drinks of alcohol per week. He reports that he does not use drugs.    Family History  Family History   Problem Relation Name Age of Onset    Cancer Mother      Heart disease Father      Coronary artery disease Sister      Colon cancer Brother      Heart attack Brother          Allergies  No Known Allergies  Review of Systems  Pre-sedation Evaluation:  ASA Classification - ASA 2 - Patient with mild systemic disease with no functional limitations  Mallampati Score - II (hard and soft palate, upper portion of tonsils and uvula visible)    Physical Exam  Awake alert no acute distress  Lungs are clear heart is regular rate and rhythm  Abdomen is soft and nontender.  Relatively poor dentition  No extremity edema  Last Recorded Vitals  Blood pressure 146/77, pulse 82, temperature 36.6 °C (97.9 °F), temperature source Temporal, resp. rate 16, height 1.88 m (6' 2\"), weight 83.4 kg (183 lb 12.8 oz), SpO2 97%.     Assessment/Plan   Patient presents for routine screening colonoscopy due to family history of colon cancer. We discussed the indication for colonoscopy as well as the differential diagnosis including anything from no findings " all the way up to a colon cancer.  We reviewed the prep as well as the risks and potential complications including but not limited to bleeding, infection, reaction to the anesthetic, stroke MI and/or death.  We discussed the risk of perforation or need for completion barium studies.  All questions were answered and she asked us to proceed.      PTA/Current Medications:  (Not in a hospital admission)    Current Outpatient Medications   Medication Sig Dispense Refill    atorvastatin (Lipitor) 10 mg tablet Take 1 tablet (10 mg) by mouth once daily. 30 tablet 11    tiotropium-olodateroL (Stiolto Respimat) 2.5-2.5 mcg/actuation mist inhaler Inhale 2 Inhalations once daily. 1 g 11    fluticasone-umeclidin-vilanter (Trelegy Ellipta) 100-62.5-25 mcg blister with device Inhale 1 puff once daily. 30 each 11     Current Facility-Administered Medications   Medication Dose Route Frequency Provider Last Rate Last Admin    sodium chloride 0.9% infusion  100 mL/hr intravenous Continuous Letty Yang  mL/hr at 05/20/24 0715 100 mL/hr at 05/20/24 0715     Letty Yang MD

## 2024-05-21 ENCOUNTER — TELEPHONE (OUTPATIENT)
Dept: SURGERY | Facility: CLINIC | Age: 59
End: 2024-05-21
Payer: COMMERCIAL

## 2024-05-21 NOTE — TELEPHONE ENCOUNTER
Spoke to patient after Colonoscopy. Pt denies fever, chills, nausea, and vomiting. Pt had no questions at this time.  Provided office number to patient for any questions. Pt will follow up as needed.

## 2024-09-19 ENCOUNTER — OFFICE VISIT (OUTPATIENT)
Dept: PRIMARY CARE | Facility: CLINIC | Age: 59
End: 2024-09-19
Payer: COMMERCIAL

## 2024-09-23 ENCOUNTER — APPOINTMENT (OUTPATIENT)
Age: 59
End: 2024-09-23
Payer: COMMERCIAL

## 2024-09-25 ENCOUNTER — HOSPITAL ENCOUNTER (OUTPATIENT)
Dept: CARDIOLOGY | Facility: HOSPITAL | Age: 59
Discharge: HOME | End: 2024-09-25
Payer: COMMERCIAL

## 2024-09-25 ENCOUNTER — HOSPITAL ENCOUNTER (EMERGENCY)
Facility: HOSPITAL | Age: 59
Discharge: HOME | End: 2024-09-25
Attending: EMERGENCY MEDICINE
Payer: COMMERCIAL

## 2024-09-25 ENCOUNTER — APPOINTMENT (OUTPATIENT)
Dept: RADIOLOGY | Facility: HOSPITAL | Age: 59
End: 2024-09-25
Payer: COMMERCIAL

## 2024-09-25 VITALS
TEMPERATURE: 98.7 F | RESPIRATION RATE: 16 BRPM | WEIGHT: 190 LBS | BODY MASS INDEX: 23.62 KG/M2 | HEART RATE: 56 BPM | SYSTOLIC BLOOD PRESSURE: 145 MMHG | HEIGHT: 75 IN | OXYGEN SATURATION: 95 % | DIASTOLIC BLOOD PRESSURE: 99 MMHG

## 2024-09-25 DIAGNOSIS — R42 DIZZINESS: Primary | ICD-10-CM

## 2024-09-25 DIAGNOSIS — R00.1 BRADYCARDIA: ICD-10-CM

## 2024-09-25 LAB
ALBUMIN SERPL BCP-MCNC: 3.6 G/DL (ref 3.4–5)
ALP SERPL-CCNC: 85 U/L (ref 33–136)
ALT SERPL W P-5'-P-CCNC: 22 U/L (ref 10–52)
ANION GAP SERPL CALC-SCNC: 12 MMOL/L (ref 10–20)
AST SERPL W P-5'-P-CCNC: 13 U/L (ref 9–39)
BASOPHILS # BLD AUTO: 0.07 X10*3/UL (ref 0–0.1)
BASOPHILS NFR BLD AUTO: 0.6 %
BILIRUB SERPL-MCNC: 0.5 MG/DL (ref 0–1.2)
BUN SERPL-MCNC: 12 MG/DL (ref 6–23)
CALCIUM SERPL-MCNC: 8.8 MG/DL (ref 8.6–10.3)
CARDIAC TROPONIN I PNL SERPL HS: 3 NG/L (ref 0–20)
CARDIAC TROPONIN I PNL SERPL HS: 4 NG/L (ref 0–20)
CHLORIDE SERPL-SCNC: 111 MMOL/L (ref 98–107)
CO2 SERPL-SCNC: 22 MMOL/L (ref 21–32)
CREAT SERPL-MCNC: 0.7 MG/DL (ref 0.5–1.3)
EGFRCR SERPLBLD CKD-EPI 2021: >90 ML/MIN/1.73M*2
EOSINOPHIL # BLD AUTO: 0.23 X10*3/UL (ref 0–0.7)
EOSINOPHIL NFR BLD AUTO: 2 %
ERYTHROCYTE [DISTWIDTH] IN BLOOD BY AUTOMATED COUNT: 12.7 % (ref 11.5–14.5)
GLUCOSE SERPL-MCNC: 114 MG/DL (ref 74–99)
HCT VFR BLD AUTO: 47.9 % (ref 41–52)
HGB BLD-MCNC: 15.4 G/DL (ref 13.5–17.5)
HOLD SPECIMEN: NORMAL
IMM GRANULOCYTES # BLD AUTO: 0.1 X10*3/UL (ref 0–0.7)
IMM GRANULOCYTES NFR BLD AUTO: 0.8 % (ref 0–0.9)
LYMPHOCYTES # BLD AUTO: 2.29 X10*3/UL (ref 1.2–4.8)
LYMPHOCYTES NFR BLD AUTO: 19.4 %
MAGNESIUM SERPL-MCNC: 1.91 MG/DL (ref 1.6–2.4)
MCH RBC QN AUTO: 30.4 PG (ref 26–34)
MCHC RBC AUTO-ENTMCNC: 32.2 G/DL (ref 32–36)
MCV RBC AUTO: 95 FL (ref 80–100)
MONOCYTES # BLD AUTO: 0.92 X10*3/UL (ref 0.1–1)
MONOCYTES NFR BLD AUTO: 7.8 %
NEUTROPHILS # BLD AUTO: 8.17 X10*3/UL (ref 1.2–7.7)
NEUTROPHILS NFR BLD AUTO: 69.4 %
NRBC BLD-RTO: 0 /100 WBCS (ref 0–0)
PLATELET # BLD AUTO: 217 X10*3/UL (ref 150–450)
POTASSIUM SERPL-SCNC: 4.1 MMOL/L (ref 3.5–5.3)
PROT SERPL-MCNC: 5.8 G/DL (ref 6.4–8.2)
RBC # BLD AUTO: 5.07 X10*6/UL (ref 4.5–5.9)
SODIUM SERPL-SCNC: 141 MMOL/L (ref 136–145)
WBC # BLD AUTO: 11.8 X10*3/UL (ref 4.4–11.3)

## 2024-09-25 PROCEDURE — 2500000004 HC RX 250 GENERAL PHARMACY W/ HCPCS (ALT 636 FOR OP/ED): Performed by: EMERGENCY MEDICINE

## 2024-09-25 PROCEDURE — 96374 THER/PROPH/DIAG INJ IV PUSH: CPT

## 2024-09-25 PROCEDURE — 84484 ASSAY OF TROPONIN QUANT: CPT | Performed by: EMERGENCY MEDICINE

## 2024-09-25 PROCEDURE — 71045 X-RAY EXAM CHEST 1 VIEW: CPT

## 2024-09-25 PROCEDURE — 71045 X-RAY EXAM CHEST 1 VIEW: CPT | Performed by: RADIOLOGY

## 2024-09-25 PROCEDURE — 93005 ELECTROCARDIOGRAM TRACING: CPT

## 2024-09-25 PROCEDURE — 36415 COLL VENOUS BLD VENIPUNCTURE: CPT | Performed by: EMERGENCY MEDICINE

## 2024-09-25 PROCEDURE — 70450 CT HEAD/BRAIN W/O DYE: CPT | Performed by: RADIOLOGY

## 2024-09-25 PROCEDURE — 80053 COMPREHEN METABOLIC PANEL: CPT | Performed by: EMERGENCY MEDICINE

## 2024-09-25 PROCEDURE — 83735 ASSAY OF MAGNESIUM: CPT | Performed by: EMERGENCY MEDICINE

## 2024-09-25 PROCEDURE — 70450 CT HEAD/BRAIN W/O DYE: CPT

## 2024-09-25 PROCEDURE — 96361 HYDRATE IV INFUSION ADD-ON: CPT

## 2024-09-25 PROCEDURE — 85025 COMPLETE CBC W/AUTO DIFF WBC: CPT | Performed by: EMERGENCY MEDICINE

## 2024-09-25 PROCEDURE — 99285 EMERGENCY DEPT VISIT HI MDM: CPT | Mod: 25

## 2024-09-25 RX ORDER — ONDANSETRON HYDROCHLORIDE 2 MG/ML
4 INJECTION, SOLUTION INTRAVENOUS ONCE
Status: COMPLETED | OUTPATIENT
Start: 2024-09-25 | End: 2024-09-25

## 2024-09-25 ASSESSMENT — COLUMBIA-SUICIDE SEVERITY RATING SCALE - C-SSRS
2. HAVE YOU ACTUALLY HAD ANY THOUGHTS OF KILLING YOURSELF?: NO
1. IN THE PAST MONTH, HAVE YOU WISHED YOU WERE DEAD OR WISHED YOU COULD GO TO SLEEP AND NOT WAKE UP?: NO
6. HAVE YOU EVER DONE ANYTHING, STARTED TO DO ANYTHING, OR PREPARED TO DO ANYTHING TO END YOUR LIFE?: NO

## 2024-09-25 ASSESSMENT — PAIN SCALES - GENERAL
PAINLEVEL_OUTOF10: 0 - NO PAIN
PAINLEVEL_OUTOF10: 0 - NO PAIN

## 2024-09-25 ASSESSMENT — PAIN - FUNCTIONAL ASSESSMENT: PAIN_FUNCTIONAL_ASSESSMENT: 0-10

## 2024-09-25 NOTE — ED PROVIDER NOTES
HPI   Chief Complaint   Patient presents with    Dizziness     Brought to ED per [facility] squad from home. He reports that he was having a cup of coffee and had sudden onset dizziness. He states that he threw some water on face. He also felt nauseated, dry heaved and vomited in route. He denies any pain or SOB. He reports that s/s have started to clear. EKG done at         Limitations to History: None    HPI: 59-year-old male presents with concern for dizziness.  Described as the room spinning and difficulty catching his balance.  States that this occurred approximately 1 and half hours prior to arrival.  Lasted 10 to 15 minutes.  Occurred when he was seated and just having his coffee in the morning.  States he did become diaphoretic and nauseous.  Denies any chest pain but does admit to some shortness of breath which he attributes to COPD.  Denies any abdominal pain, urinary symptoms.  Denies any fall or head injury.    ------------------------------------------------------------------------------------------------------------------------------------------  Physical Exam:    VS: As documented in the triage note and EMR flowsheet from this visit were reviewed.    Appearance: Alert. cooperative,  in no acute distress.   Skin: Intact,  dry skin, no lesions, rash, petechiae or purpura.   Eyes: PERRLA, EOMs intact,  Conjunctiva pink with no redness or exudates.   HENT: Normocephalic, atraumatic. Nares patent. No intraoral lesions.   Neck: Supple, without meningismus. Trachea at midline. No lymphadenopathy.  Pulmonary: Clear bilaterally with good chest wall excursion. No rales, rhonchi or wheezing. No accessory muscle use or stridor.  Cardiac: Regular rate and rhythm, no rubs, murmurs, or gallops.   Abdomen: Abdomen is soft, nontender, and nondistended.  No palpable organomegaly.  No rebound or guarding.  No CVA tenderness. Nonsurgical abdomen.  Genitourinary: Exam deferred.  Musculoskeletal: Full range of motion.  Pulses  full and equal. No cyanosis, clubbing, or edema.  Neurological:  Cranial nerves are grossly intact, grossly normal sensation, no weakness, no focal findings identified.  NIH was 0.  Psychiatric: Appropriate mood and affect.                Patient History   Past Medical History:   Diagnosis Date    COPD (chronic obstructive pulmonary disease) (Multi)     Elevated cholesterol      Past Surgical History:   Procedure Laterality Date    COLONOSCOPY  05/20/2024    REPEAT  YRS/ DR PETERSON    OTHER SURGICAL HISTORY  02/02/2021    Ankle surgery     Family History   Problem Relation Name Age of Onset    Cancer Mother      Heart disease Father      Coronary artery disease Sister      Colon cancer Brother      Heart attack Brother       Social History     Tobacco Use    Smoking status: Some Days     Types: Cigars, Cigarettes     Passive exposure: Past    Smokeless tobacco: Never   Vaping Use    Vaping status: Never Used   Substance Use Topics    Alcohol use: Yes     Alcohol/week: 4.0 standard drinks of alcohol     Types: 4 Standard drinks or equivalent per week    Drug use: Never       Physical Exam   ED Triage Vitals [09/25/24 0715]   Temperature Heart Rate Respirations BP   37.1 °C (98.7 °F) 54 18 143/61      Pulse Ox Temp src Heart Rate Source Patient Position   97 % -- -- --      BP Location FiO2 (%)     -- --       Physical Exam      ED Course & MDM   Diagnoses as of 09/25/24 1122   Dizziness   Bradycardia                 No data recorded     Manoj Coma Scale Score: 15 (09/25/24 0721 : Jordan Santa RN)                           Medical Decision Making  Labs Reviewed  CBC WITH AUTO DIFFERENTIAL - Abnormal     WBC                           11.8 (*)               nRBC                          0.0                    RBC                           5.07                   Hemoglobin                    15.4                   Hematocrit                    47.9                   MCV                           95                      MCH                           30.4                   MCHC                          32.2                   RDW                           12.7                   Platelets                     217                    Neutrophils %                 69.4                   Immature Granulocytes %, Automated   0.8                    Lymphocytes %                 19.4                   Monocytes %                   7.8                    Eosinophils %                 2.0                    Basophils %                   0.6                    Neutrophils Absolute          8.17 (*)               Immature Granulocytes Absolute, Au*   0.10                   Lymphocytes Absolute          2.29                   Monocytes Absolute            0.92                   Eosinophils Absolute          0.23                   Basophils Absolute            0.07                COMPREHENSIVE METABOLIC PANEL - Abnormal     Glucose                       114 (*)                Sodium                        141                    Potassium                     4.1                    Chloride                      111 (*)                Bicarbonate                   22                     Anion Gap                     12                     Urea Nitrogen                 12                     Creatinine                    0.70                   eGFR                          >90                    Calcium                       8.8                    Albumin                       3.6                    Alkaline Phosphatase          85                     Total Protein                 5.8 (*)                AST                           13                     Bilirubin, Total              0.5                    ALT                           22                  MAGNESIUM - Normal     Magnesium                     1.91                SERIAL TROPONIN-INITIAL - Normal     Troponin I, High Sensitivity   3                          Narrative: Less than 99th  percentile of normal range cutoff-                  Female and children under 18 years old <14 ng/L; Male <21 ng/L: Negative                  Repeat testing should be performed if clinically indicated.                                     Female and children under 18 years old 14-50 ng/L; Male 21-50 ng/L:                  Consistent with possible cardiac damage and possible increased clinical                   risk. Serial measurements may help to assess extent of myocardial damage.                                     >50 ng/L: Consistent with cardiac damage, increased clinical risk and                  myocardial infarction. Serial measurements may help assess extent of                   myocardial damage.                                      NOTE: Children less than 1 year old may have higher baseline troponin                   levels and results should be interpreted in conjunction with the overall                   clinical context.                                     NOTE: Troponin I testing is performed using a different                   testing methodology at [facility] than at other                   [facility]. Direct result comparisons should only                   be made within the same method.  SERIAL TROPONIN, 1 HOUR - Normal     Troponin I, High Sensitivity   4                          Narrative: Less than 99th percentile of normal range cutoff-                  Female and children under 18 years old <14 ng/L; Male <21 ng/L: Negative                  Repeat testing should be performed if clinically indicated.                                     Female and children under 18 years old 14-50 ng/L; Male 21-50 ng/L:                  Consistent with possible cardiac damage and possible increased clinical                   risk. Serial measurements may help to assess extent of myocardial damage.                                     >50 ng/L: Consistent with cardiac damage, increased  clinical risk and                  myocardial infarction. Serial measurements may help assess extent of                   myocardial damage.                                      NOTE: Children less than 1 year old may have higher baseline troponin                   levels and results should be interpreted in conjunction with the overall                   clinical context.                                     NOTE: Troponin I testing is performed using a different                   testing methodology at [facility] than at other                   system hospitals. Direct result comparisons should only                   be made within the same method.  TROPONIN SERIES- (INITIAL, 1 HR)  CT head wo IV contrast   Final Result    1. No evidence of acute cortical infarct or intracranial hemorrhage.    2. No evidence of intracranial hemorrhage or displaced skull fracture.                MACRO:    None          Signed by: Erica Lopez 9/25/2024 8:26 AM    Dictation workstation:   [id number]     XR chest 1 view   Final Result    1. No acute cardiopulmonary process.          MACRO:    None.          Signed by: Erica Lopez 9/25/2024 8:18 AM    Dictation workstation:   [id number]     Medical Decision Making:    Patient appears well nontoxic.  Lab work unremarkable.  High-sensitivity troponin negative x 2.  CT brain negative.  Chest x-ray clear.  Patient does have episodes of bradycardia into the upper 40s.  Patient was treated with 1 L normal saline and Zofran.  Feeling improved during his stay.  Case discussed with cardiologist on-call, Dr. Wyatt, who advises admission for further observation and evaluation of his presyncope versus vertigo.  This was discussed with the patient who is declining admission.  Risk and benefits were discussed.  Patient states understanding.  Will be given follow-up with primary care as well as cardiology.  Asked to return for new or worsening symptoms.  Stable at time of  discharge.    Differential Diagnoses Considered: ACS, electrolyte abnormality, volume depletion, intracranial process    Independent Interpretation of Studies:  I independently interpreted: CT brain shows no intracranial hemorrhage or mass.  Chest x-ray without pneumonia or pneumothorax.    Escalation of Care:  Appropriate for discharge and follow-up with primary care and cardiology.          Procedure  ECG 12 lead    Performed by: Keith Zuniga DO  Authorized by: Keith Zuniga DO    ECG interpreted by ED Physician in the absence of a cardiologist: yes    Comments:      EKG interpreted by Dr. Keith Znuiga: Sinus bradycardia 55 bpm.  KY interval 158 ms.  QTc of 419 ms.  ECG 12 lead    Performed by: Keith Zuniga DO  Authorized by: Keith Zuniga DO    ECG interpreted by ED Physician in the absence of a cardiologist: yes    Comments:      Repeat EKG performed at 0820 and interpreted by Dr. Keith Zuniga at 0822: Sinus bradycardia 48 bpm.  KY interval 160 ms.  QTc 410 ms.       Keith Zuniga DO  09/25/24 1124

## 2024-09-27 NOTE — PROGRESS NOTES
[facility]  Cardiology Clinic Visit Note    History of present illness:  This is a 59 y.o. male current smoker with a history of COPD and hyperlipidemia who presents to the clinic for ED follow-up for dizziness.  On 9/25/2024 he was at home drinking coffee when he had sudden onset of dizziness.  He got up through some water in his face.  He was also nauseated vomited.  He activated EMS who brought him to the [facility] emergency department for evaluation.  EKG showed sinus bradycardia with heart rates ranging from the mid 40s to mid 50s.  The ED physician discussed the case with on-call cardiologist who advised patient be admitted for observation.  Patient refused admission and was discharged with cardiology follow-up.    Subjective:  He has noticed and increase in shortness of breath with minimal physical activity. He is a  and climbs ladders a lot which is becoming more difficult. Denies chest pain or tightness. Denies palpitations, syncope, leg edema or orthopnea.     Active Issues  Bradycardia  Dizziness  Dyspnea on exertion  -EKG shows sinus bradycardia heart rate of 45 bpm  -Electrolytes within normal limits  -Head CT scan without contrast negative for intracranial hemorrhage or acute process.  -CXR negative for acute pulmonary process  -Right duplex study August 2023 showed less than 50% stenosis right left proximal internal carotid arteries.     Hyperlipidemia  -Lipid panel 4/29/2024 shows   -Moderate intensity statin    Carotid artery disease  -less than 50% stenosis with heterogenous plaque bilaterally on duplex study August 2023  -aspirin and statin.      Past Medical History  Past Medical History:   Diagnosis Date    COPD (chronic obstructive pulmonary disease) (Multi)     Elevated cholesterol        Past Surgical History  Past Surgical History:   Procedure Laterality Date    COLONOSCOPY  05/20/2024    REPEAT  YRS/ DR PETERSON     OTHER SURGICAL HISTORY  02/02/2021    Ankle surgery       Medications  Current Outpatient Medications   Medication Instructions    aspirin 81 mg, oral, Daily    atorvastatin (LIPITOR) 40 mg, oral, Daily    fluticasone-umeclidin-vilanter (Trelegy Ellipta) 100-62.5-25 mcg blister with device 1 puff, inhalation, Daily    tiotropium-olodateroL (Stiolto Respimat) 2.5-2.5 mcg/actuation mist inhaler 2 Inhalations, inhalation, Daily       Allergies  No Known Allergies    Social History  Social History     Tobacco Use    Smoking status: Some Days     Types: Cigars, Cigarettes     Passive exposure: Past    Smokeless tobacco: Never   Vaping Use    Vaping status: Never Used   Substance Use Topics    Alcohol use: Yes     Alcohol/week: 4.0 standard drinks of alcohol     Types: 4 Standard drinks or equivalent per week    Drug use: Never       Family History  Family History   Problem Relation Name Age of Onset    Cancer Mother      Heart disease Father      Coronary artery disease Sister      Colon cancer Brother      Heart attack Brother         VITALS  Vitals:    09/30/24 1416   BP: 148/80   Pulse: 82   SpO2: 98%       Weight  Vitals:    09/30/24 1416   Weight: 84.1 kg (185 lb 8 oz)       PHYSICAL EXAM  Physical Exam  Vitals and nursing note reviewed.   Constitutional:       General: He is not in acute distress.  HENT:      Head: Normocephalic and atraumatic.      Mouth/Throat:      Mouth: Mucous membranes are moist.      Pharynx: Oropharynx is clear.   Eyes:      General: No scleral icterus.     Pupils: Pupils are equal, round, and reactive to light.   Cardiovascular:      Rate and Rhythm: Normal rate and regular rhythm.      Pulses: Normal pulses.      Heart sounds: Normal heart sounds, S1 normal and S2 normal. No murmur heard.     No friction rub.   Pulmonary:      Effort: Pulmonary effort is normal.      Breath sounds: Wheezing present.   Abdominal:      General: Bowel sounds are normal. There is no distension.       Palpations: Abdomen is soft.      Tenderness: There is no abdominal tenderness.   Musculoskeletal:         General: Normal range of motion.      Cervical back: Normal range of motion and neck supple.      Right lower leg: No edema.      Left lower leg: No edema.   Skin:     General: Skin is warm and dry.      Capillary Refill: Capillary refill takes less than 2 seconds.      Findings: No rash.   Neurological:      General: No focal deficit present.      Mental Status: He is alert.   Psychiatric:         Mood and Affect: Mood normal.         Behavior: Behavior normal.         Cardiovascular Labs  Lab Results   Component Value Date    HGB 15.4 09/25/2024    HGB 16.4 04/29/2024    HGB 16.0 08/25/2023     09/25/2024    WBC 11.8 (H) 09/25/2024     09/25/2024    K 4.1 09/25/2024    CREATININE 0.70 09/25/2024    CREATININE 0.78 04/29/2024    CREATININE 0.70 08/25/2023    CREATININE 0.68 09/09/2021    CREATININE 0.71 02/04/2021    BUN 12 09/25/2024    CALCIUM 8.8 09/25/2024    TROPHS 4 09/25/2024    TROPHS 3 09/25/2024    LDLF 72 08/25/2023       Echocardiogram   No results found for this or any previous visit from the past 1095 days.      Assessment and Plan  The 10-year ASCVD risk score (Marin DIANA, et al., 2019) is: 16.7%    Values used to calculate the score:      Age: 59 years      Sex: Male      Is Non- : No      Diabetic: No      Tobacco smoker: Yes      Systolic Blood Pressure: 148 mmHg      Is BP treated: No      HDL Cholesterol: 58 mg/dL      Total Cholesterol: 203 mg/dL  Low Risk: <5%  Borderline Risk: 5%-7.4%  Intermediate Risk: 7.5% - 19.9%  High Risk: >20%    -Concern for symptomatic bradycardia or high degree AV block vs vasovagal. Will check TSH with reflex to T4 and obtain 14-day mobile cardiac telemetry.  Obtain echocardiogram to evaluate structural heart. CCTA to rule out coronary disease due to intermediate 10 year risk and intermediate pre test probability with  anginal equivalent symptoms. I will increase his statin to high intensity.     Return to Care:  After testing     If your symptoms worsen or progress please go directory to your nearest emergency department for evaluation.     Thank you for this interesting clinical case and allowing me to participate in the care of this patient. Please reach me out if you have any questions or if you need any clarifications regarding this patient's care.    **Disclaimer: This note was dictated by speech recognition, and every effort has been made to prevent any error in transcription, however minor errors may be present**  ___________________________________________________  Calvin Rivera, MSN, CNP, ACNPC, CCRN  Advanced Practice Provider, Nurse Practitioner  Division of Cardiovascular Medicine  [facility]  [facility]

## 2024-09-29 LAB
ATRIAL RATE: 48 BPM
ATRIAL RATE: 55 BPM
P AXIS: 83 DEGREES
P AXIS: 84 DEGREES
P OFFSET: 200 MS
P OFFSET: 200 MS
P ONSET: 139 MS
P ONSET: 140 MS
PR INTERVAL: 158 MS
PR INTERVAL: 160 MS
Q ONSET: 219 MS
Q ONSET: 219 MS
QRS COUNT: 8 BEATS
QRS COUNT: 9 BEATS
QRS DURATION: 96 MS
QRS DURATION: 98 MS
QT INTERVAL: 438 MS
QT INTERVAL: 460 MS
QTC CALCULATION(BAZETT): 410 MS
QTC CALCULATION(BAZETT): 419 MS
QTC FREDERICIA: 425 MS
QTC FREDERICIA: 427 MS
R AXIS: 93 DEGREES
R AXIS: 94 DEGREES
T AXIS: 66 DEGREES
T AXIS: 81 DEGREES
T OFFSET: 438 MS
T OFFSET: 449 MS
VENTRICULAR RATE: 48 BPM
VENTRICULAR RATE: 55 BPM

## 2024-09-30 ENCOUNTER — APPOINTMENT (OUTPATIENT)
Dept: CARDIOLOGY | Facility: CLINIC | Age: 59
End: 2024-09-30
Payer: COMMERCIAL

## 2024-09-30 VITALS
BODY MASS INDEX: 23.06 KG/M2 | WEIGHT: 185.5 LBS | HEIGHT: 75 IN | HEART RATE: 82 BPM | SYSTOLIC BLOOD PRESSURE: 148 MMHG | OXYGEN SATURATION: 98 % | DIASTOLIC BLOOD PRESSURE: 80 MMHG

## 2024-09-30 DIAGNOSIS — R00.1 BRADYCARDIA: ICD-10-CM

## 2024-09-30 DIAGNOSIS — R06.02 SHORTNESS OF BREATH: Primary | ICD-10-CM

## 2024-09-30 DIAGNOSIS — R42 DIZZINESS: ICD-10-CM

## 2024-09-30 DIAGNOSIS — R06.02 SOB (SHORTNESS OF BREATH) ON EXERTION: ICD-10-CM

## 2024-09-30 DIAGNOSIS — E78.2 HYPERLIPEMIA, MIXED: ICD-10-CM

## 2024-09-30 DIAGNOSIS — I65.23 BILATERAL CAROTID ARTERY STENOSIS: ICD-10-CM

## 2024-09-30 PROCEDURE — 3008F BODY MASS INDEX DOCD: CPT

## 2024-09-30 PROCEDURE — 99204 OFFICE O/P NEW MOD 45 MIN: CPT

## 2024-09-30 RX ORDER — METOPROLOL TARTRATE 100 MG/1
100 TABLET ORAL ONCE
OUTPATIENT
Start: 2024-09-30 | End: 2024-09-30

## 2024-09-30 RX ORDER — METOPROLOL TARTRATE 1 MG/ML
5 INJECTION, SOLUTION INTRAVENOUS ONCE
OUTPATIENT
Start: 2024-09-30 | End: 2024-09-30

## 2024-09-30 RX ORDER — ATORVASTATIN CALCIUM 40 MG/1
40 TABLET, FILM COATED ORAL DAILY
Qty: 30 TABLET | Refills: 11 | Status: SHIPPED | OUTPATIENT
Start: 2024-09-30 | End: 2025-09-30

## 2024-09-30 RX ORDER — LORAZEPAM 2 MG/ML
0.5 INJECTION INTRAMUSCULAR EVERY 5 MIN PRN
OUTPATIENT
Start: 2024-09-30

## 2024-09-30 RX ORDER — METOPROLOL TARTRATE 1 MG/ML
5 INJECTION, SOLUTION INTRAVENOUS ONCE AS NEEDED
OUTPATIENT
Start: 2024-09-30

## 2024-09-30 RX ORDER — NITROGLYCERIN 0.4 MG/1
0.8 TABLET SUBLINGUAL ONCE
OUTPATIENT
Start: 2024-09-30 | End: 2024-09-30

## 2024-09-30 RX ORDER — NAPROXEN SODIUM 220 MG/1
81 TABLET, FILM COATED ORAL DAILY
Start: 2024-09-30 | End: 2025-09-30

## 2024-09-30 RX ORDER — METOPROLOL TARTRATE 100 MG/1
100 TABLET ORAL ONCE AS NEEDED
OUTPATIENT
Start: 2024-09-30

## 2024-10-01 ENCOUNTER — HOSPITAL ENCOUNTER (OUTPATIENT)
Dept: CARDIOLOGY | Facility: HOSPITAL | Age: 59
Discharge: HOME | End: 2024-10-01
Payer: COMMERCIAL

## 2024-10-01 DIAGNOSIS — R00.1 BRADYCARDIA: ICD-10-CM

## 2024-10-01 PROCEDURE — 9420000001 HC RT PATIENT EDUCATION 5 MIN

## 2024-10-01 PROCEDURE — 93270 REMOTE 30 DAY ECG REV/REPORT: CPT

## 2024-10-17 ENCOUNTER — HOSPITAL ENCOUNTER (OUTPATIENT)
Dept: RADIOLOGY | Facility: HOSPITAL | Age: 59
Discharge: HOME | End: 2024-10-17
Payer: COMMERCIAL

## 2024-10-17 VITALS — OXYGEN SATURATION: 96 % | HEART RATE: 61 BPM | SYSTOLIC BLOOD PRESSURE: 125 MMHG | DIASTOLIC BLOOD PRESSURE: 71 MMHG

## 2024-10-17 DIAGNOSIS — R06.02 SOB (SHORTNESS OF BREATH) ON EXERTION: ICD-10-CM

## 2024-10-17 PROCEDURE — 2550000001 HC RX 255 CONTRASTS

## 2024-10-17 PROCEDURE — 75574 CT ANGIO HRT W/3D IMAGE: CPT

## 2024-10-17 PROCEDURE — 75574 CT ANGIO HRT W/3D IMAGE: CPT | Performed by: STUDENT IN AN ORGANIZED HEALTH CARE EDUCATION/TRAINING PROGRAM

## 2024-10-17 PROCEDURE — 2500000001 HC RX 250 WO HCPCS SELF ADMINISTERED DRUGS (ALT 637 FOR MEDICARE OP)

## 2024-10-17 RX ORDER — METOPROLOL TARTRATE 1 MG/ML
5 INJECTION, SOLUTION INTRAVENOUS ONCE AS NEEDED
Status: DISCONTINUED | OUTPATIENT
Start: 2024-10-17 | End: 2024-10-18 | Stop reason: HOSPADM

## 2024-10-17 RX ORDER — METOPROLOL TARTRATE 50 MG/1
100 TABLET ORAL ONCE AS NEEDED
Status: DISCONTINUED | OUTPATIENT
Start: 2024-10-17 | End: 2024-10-18 | Stop reason: HOSPADM

## 2024-10-17 RX ORDER — LORAZEPAM 2 MG/ML
0.5 INJECTION INTRAMUSCULAR EVERY 5 MIN PRN
Status: DISCONTINUED | OUTPATIENT
Start: 2024-10-17 | End: 2024-10-18 | Stop reason: HOSPADM

## 2024-10-17 RX ORDER — METOPROLOL TARTRATE 50 MG/1
100 TABLET ORAL ONCE
Status: DISCONTINUED | OUTPATIENT
Start: 2024-10-17 | End: 2024-10-18 | Stop reason: HOSPADM

## 2024-10-17 RX ORDER — NITROGLYCERIN 0.4 MG/1
0.8 TABLET SUBLINGUAL ONCE
Status: COMPLETED | OUTPATIENT
Start: 2024-10-17 | End: 2024-10-17

## 2024-10-17 RX ORDER — METOPROLOL TARTRATE 1 MG/ML
5 INJECTION, SOLUTION INTRAVENOUS ONCE
Status: DISCONTINUED | OUTPATIENT
Start: 2024-10-17 | End: 2024-10-18 | Stop reason: HOSPADM

## 2024-10-21 ENCOUNTER — APPOINTMENT (OUTPATIENT)
Age: 59
End: 2024-10-21
Payer: COMMERCIAL

## 2024-10-21 VITALS
BODY MASS INDEX: 23.2 KG/M2 | HEIGHT: 75 IN | OXYGEN SATURATION: 96 % | WEIGHT: 186.6 LBS | SYSTOLIC BLOOD PRESSURE: 126 MMHG | HEART RATE: 76 BPM | DIASTOLIC BLOOD PRESSURE: 72 MMHG

## 2024-10-21 DIAGNOSIS — M25.552 PAIN OF LEFT HIP: ICD-10-CM

## 2024-10-21 DIAGNOSIS — J44.9 CHRONIC OBSTRUCTIVE PULMONARY DISEASE, UNSPECIFIED COPD TYPE (MULTI): Primary | ICD-10-CM

## 2024-10-21 PROCEDURE — 4004F PT TOBACCO SCREEN RCVD TLK: CPT | Performed by: NURSE PRACTITIONER

## 2024-10-21 PROCEDURE — 99214 OFFICE O/P EST MOD 30 MIN: CPT | Performed by: NURSE PRACTITIONER

## 2024-10-21 PROCEDURE — 3008F BODY MASS INDEX DOCD: CPT | Performed by: NURSE PRACTITIONER

## 2024-10-21 RX ORDER — PREDNISONE 20 MG/1
40 TABLET ORAL DAILY
Qty: 10 TABLET | Refills: 0 | Status: SHIPPED | OUTPATIENT
Start: 2024-10-21 | End: 2024-10-26

## 2024-10-21 RX ORDER — ALBUTEROL SULFATE 90 UG/1
2 INHALANT RESPIRATORY (INHALATION) EVERY 4 HOURS PRN
Qty: 8 G | Refills: 5 | Status: SHIPPED | OUTPATIENT
Start: 2024-10-21 | End: 2025-10-21

## 2024-10-21 ASSESSMENT — ENCOUNTER SYMPTOMS
ARTHRALGIAS: 1
JOINT SWELLING: 0
COUGH: 1
WHEEZING: 0
SHORTNESS OF BREATH: 1
FEVER: 0
ABDOMINAL PAIN: 0
DIZZINESS: 1
DYSPHORIC MOOD: 0
BACK PAIN: 0
FATIGUE: 0
LIGHT-HEADEDNESS: 0
HEADACHES: 0
NERVOUS/ANXIOUS: 0

## 2024-10-21 NOTE — PATIENT INSTRUCTIONS
Please ensure that you are removing your wallet or cell phone from your left back pocket when you are seated or driving  X-ray of your left hip and we will notify you when those results are available for review  Prednisone 40 mg daily x 5 days for COPD flare.  Add albuterol 2 puffs every 4-6 hours as needed for shortness of breath coughing or wheezing.  Office visit in 1 week for a follow-up on COPD  We will discuss medications that can be offered to help you to quit smoking at your next office visit.  Pneumococcal/influenza vaccinations will be offered at that time        Ways to Help Prevent Falls at Home    Quick Tips   ? Ask for help if you need it. Most people want to help!   ? Get up slowly after sitting or laying down   ? Wear a medical alert device or keep cell phone in your pocket   ? Use night lights, especially areas near a bathroom   ? Keep the items you use often within reach on a small stool or end table   ? Use an assistive device such as walker or cane, as directed by provider/physical therapy   ? Use a non-slip mat and grab bars in your bathroom. Look for home health sections for best options     Other Areas to Focus On   ? Exercise and nutrition: Regular exercise or taking a falls prevention class are great ways improve strength and balance. Don’t forget to stay hydrated and bring a snack!   ? Medicine side effects: Some medicines can make you sleepy or dizzy, which could cause a fall. Ask your healthcare provider about the side effects your medicines could cause. Be sure to let them know if you take any vitamins or supplements as well.   ? Tripping hazards: Remove items you could trip on, such as loose mats, rugs, cords, and clutter. Wear closed toe shoes with rubber soles.   ? Health and wellness: Get regular checkups with your healthcare provider, plus routine vision and hearing screenings. Talk with your healthcare provider about:   o Your medicines and the possible side effects - bring them in  a bag if that is easier!   o Problems with balance or feeling dizzy   o Ways to promote bone health, such as Vitamin D and calcium supplements   o Questions or concerns about falling     *Ask your healthcare team if you have questions     ©[facility], 2022

## 2024-10-21 NOTE — PROGRESS NOTES
"Subjective   Patient ID: Mo Cotton is a 59 y.o. male who presents for Hospital Follow-up (Near syncope, heart monitor was placed. Coughing, SOB ).    Mo comes to the office after having an episode of nausea, dizziness followed by an emesis and near syncopal episode.  He was seen in the emergency room on 9/25/2024 and those records were reviewed today.  He has since followed up with the cardiologist and he completed the 14-day Holter monitor, will need to complete his echocardiogram. Had CCTA.  Head feels congested.  Denies fever or chilling. Using Trelegy daily.  Feels like he isn't \"getting enough air in\". No wheezing or chest tightness.  Tobacco abuse: 5-6 cigarettes daily.  Encouraged patient to cut back and try to aim for total smoking cessation  In process of having teeth pulled in anticipation of getting dentures   L hip pain x2 MO, believes he overworked it one day and was carrying heavy objects particularly on that day and since then it has been bothering him. OTC: none.  Left hip pain is better with rest. Pain occurs daily, if walks slow & doesn't carry anything in his arms the pain in his left hip is better.   Recent colonoscopy showed internal hemorrhoids, repeat in 5 years. + Family history of colon cancer  Time Code:  1. Preparation for patient's visit (reviewing chart, current medical record, outside health provider records, previous history, exam, test, procedure, and medications)  2. Face-to-Face encounter obtaining history from patient/family/caregivers; performing evaluation and exam; ordering tests or procedures; referring and communicating with other health care providers; counseling and education of the patient/family/caregivers; independently interpreting results (tests, labs, procedures, imaging) and communicating and explaining results to the patient/family/caregivers  3. Coordination of care; preparing and printing discharge instructions and any educational material for the " "patient/family/caregivers. Documenting clinical information into the electronic medical record  4. Reviewing OARRS as needed  MDM: 1) complexity: more than 1 stable chronic condition addressed or 1 acute illness addressed. 2) Data: tests interpreted and/or ordered, took independent history or records reviewed. 3) Risk: moderate risk due to nature of medical conditions/comorbidity or medications ordered or surgical or procedural referral          Review of Systems   Constitutional:  Negative for fatigue and fever.   Eyes:  Negative for visual disturbance.   Respiratory:  Positive for cough and shortness of breath. Negative for wheezing.    Gastrointestinal:  Negative for abdominal pain.   Musculoskeletal:  Positive for arthralgias. Negative for back pain and joint swelling.        Left hip pain   Neurological:  Positive for dizziness. Negative for light-headedness and headaches.   Psychiatric/Behavioral:  Negative for dysphoric mood. The patient is not nervous/anxious.        Objective   /72   Pulse 76   Ht 1.905 m (6' 3\")   Wt 84.6 kg (186 lb 9.6 oz)   SpO2 96%   BMI 23.32 kg/m²     Physical Exam  Vitals and nursing note reviewed.   Constitutional:       Appearance: Normal appearance.   Neck:      Vascular: Carotid bruit present.      Comments: R carotid bruit  Cardiovascular:      Rate and Rhythm: Normal rate and regular rhythm.      Heart sounds: Normal heart sounds.   Pulmonary:      Effort: Pulmonary effort is normal.      Breath sounds: Normal breath sounds.   Musculoskeletal:      Left hip: Tenderness present. No crepitus. Normal range of motion. Normal strength.      Comments: Tenderness to outer lateral left hip    no radiation to the left groin   No change in pain with abduction or abduction of hip   Skin:     General: Skin is warm and dry.   Neurological:      General: No focal deficit present.      Mental Status: He is alert and oriented to person, place, and time.   Psychiatric:         Mood " and Affect: Mood normal.         Thought Content: Thought content normal.         Assessment/Plan   Problem List Items Addressed This Visit             ICD-10-CM    COPD (chronic obstructive pulmonary disease) (Multi) - Primary J44.9    Relevant Medications    albuterol (Ventolin HFA) 90 mcg/actuation inhaler    predniSONE (Deltasone) 20 mg tablet    Other Relevant Orders    Follow Up In Primary Care - Established    Pain of left hip M25.552    Relevant Orders    XR hip left with pelvis when performed 2 or 3 views        Patient was identified as a fall risk. Risk prevention instructions provided.

## 2024-10-25 ENCOUNTER — HOSPITAL ENCOUNTER (OUTPATIENT)
Dept: RADIOLOGY | Facility: HOSPITAL | Age: 59
Discharge: HOME | End: 2024-10-25
Payer: COMMERCIAL

## 2024-10-25 DIAGNOSIS — M25.552 PAIN OF LEFT HIP: ICD-10-CM

## 2024-10-25 PROCEDURE — 73502 X-RAY EXAM HIP UNI 2-3 VIEWS: CPT | Mod: LT

## 2024-10-28 ENCOUNTER — HOSPITAL ENCOUNTER (OUTPATIENT)
Dept: CARDIOLOGY | Facility: HOSPITAL | Age: 59
Discharge: HOME | End: 2024-10-28
Payer: COMMERCIAL

## 2024-10-28 ENCOUNTER — APPOINTMENT (OUTPATIENT)
Age: 59
End: 2024-10-28
Payer: COMMERCIAL

## 2024-10-28 VITALS
HEART RATE: 79 BPM | DIASTOLIC BLOOD PRESSURE: 72 MMHG | HEIGHT: 75 IN | OXYGEN SATURATION: 97 % | WEIGHT: 187.8 LBS | SYSTOLIC BLOOD PRESSURE: 142 MMHG | BODY MASS INDEX: 23.35 KG/M2

## 2024-10-28 DIAGNOSIS — R06.02 SOB (SHORTNESS OF BREATH) ON EXERTION: ICD-10-CM

## 2024-10-28 DIAGNOSIS — J43.9 PULMONARY EMPHYSEMA, UNSPECIFIED EMPHYSEMA TYPE (MULTI): ICD-10-CM

## 2024-10-28 DIAGNOSIS — R00.1 BRADYCARDIA, UNSPECIFIED: ICD-10-CM

## 2024-10-28 DIAGNOSIS — Z23 ENCOUNTER FOR IMMUNIZATION: ICD-10-CM

## 2024-10-28 DIAGNOSIS — I10 PRIMARY HYPERTENSION: Primary | ICD-10-CM

## 2024-10-28 LAB
AORTIC VALVE MEAN GRADIENT: 6 MMHG
AORTIC VALVE PEAK VELOCITY: 1.69 M/S
AV PEAK GRADIENT: 11.4 MMHG
AVA (PEAK VEL): 2.61 CM2
AVA (VTI): 3.01 CM2
EJECTION FRACTION APICAL 4 CHAMBER: 54.9
EJECTION FRACTION: 55 %
LEFT ATRIUM VOLUME AREA LENGTH INDEX BSA: 11.8 ML/M2
LEFT VENTRICLE INTERNAL DIMENSION DIASTOLE: 5.65 CM (ref 3.5–6)
LEFT VENTRICULAR OUTFLOW TRACT DIAMETER: 2.4 CM
LV EJECTION FRACTION BIPLANE: 49 %
MITRAL VALVE E/A RATIO: 0.88
RIGHT VENTRICLE FREE WALL PEAK S': 13.8 CM/S
TRICUSPID ANNULAR PLANE SYSTOLIC EXCURSION: 2.3 CM

## 2024-10-28 PROCEDURE — 90673 RIV3 VACCINE NO PRESERV IM: CPT | Performed by: NURSE PRACTITIONER

## 2024-10-28 PROCEDURE — 90471 IMMUNIZATION ADMIN: CPT | Performed by: NURSE PRACTITIONER

## 2024-10-28 PROCEDURE — 3074F SYST BP LT 130 MM HG: CPT | Performed by: NURSE PRACTITIONER

## 2024-10-28 PROCEDURE — 4004F PT TOBACCO SCREEN RCVD TLK: CPT | Performed by: NURSE PRACTITIONER

## 2024-10-28 PROCEDURE — 3078F DIAST BP <80 MM HG: CPT | Performed by: NURSE PRACTITIONER

## 2024-10-28 PROCEDURE — 93306 TTE W/DOPPLER COMPLETE: CPT

## 2024-10-28 PROCEDURE — 99214 OFFICE O/P EST MOD 30 MIN: CPT | Performed by: NURSE PRACTITIONER

## 2024-10-28 PROCEDURE — 93306 TTE W/DOPPLER COMPLETE: CPT | Performed by: STUDENT IN AN ORGANIZED HEALTH CARE EDUCATION/TRAINING PROGRAM

## 2024-10-28 PROCEDURE — 3008F BODY MASS INDEX DOCD: CPT | Performed by: NURSE PRACTITIONER

## 2024-10-28 RX ORDER — LISINOPRIL 5 MG/1
5 TABLET ORAL DAILY
Qty: 30 TABLET | Refills: 3 | Status: SHIPPED | OUTPATIENT
Start: 2024-10-28

## 2024-10-28 RX ORDER — AZITHROMYCIN 500 MG/1
500 TABLET, FILM COATED ORAL DAILY
Qty: 3 TABLET | Refills: 2 | Status: SHIPPED | OUTPATIENT
Start: 2024-10-28 | End: 2024-10-31

## 2024-10-28 RX ORDER — PREDNISONE 20 MG/1
40 TABLET ORAL DAILY
Qty: 10 TABLET | Refills: 2 | Status: SHIPPED | OUTPATIENT
Start: 2024-10-28 | End: 2024-11-02

## 2024-10-28 ASSESSMENT — ENCOUNTER SYMPTOMS
CHILLS: 0
DIZZINESS: 0
LIGHT-HEADEDNESS: 0
WHEEZING: 0
COUGH: 1
CHEST TIGHTNESS: 0
HEADACHES: 0
SHORTNESS OF BREATH: 1
FATIGUE: 0
FEVER: 0

## 2024-10-31 ENCOUNTER — APPOINTMENT (OUTPATIENT)
Dept: CARDIOLOGY | Facility: CLINIC | Age: 59
End: 2024-10-31
Payer: COMMERCIAL

## 2024-10-31 VITALS
HEART RATE: 76 BPM | SYSTOLIC BLOOD PRESSURE: 122 MMHG | OXYGEN SATURATION: 98 % | WEIGHT: 185.4 LBS | HEIGHT: 75 IN | BODY MASS INDEX: 23.05 KG/M2 | DIASTOLIC BLOOD PRESSURE: 74 MMHG

## 2024-10-31 DIAGNOSIS — R55 VASOVAGAL EPISODE: Primary | ICD-10-CM

## 2024-10-31 PROCEDURE — 3008F BODY MASS INDEX DOCD: CPT

## 2024-10-31 PROCEDURE — 3074F SYST BP LT 130 MM HG: CPT

## 2024-10-31 PROCEDURE — 4004F PT TOBACCO SCREEN RCVD TLK: CPT

## 2024-10-31 PROCEDURE — 3078F DIAST BP <80 MM HG: CPT

## 2024-10-31 PROCEDURE — 99213 OFFICE O/P EST LOW 20 MIN: CPT

## 2024-12-02 ENCOUNTER — APPOINTMENT (OUTPATIENT)
Age: 59
End: 2024-12-02
Payer: COMMERCIAL

## 2025-03-27 ENCOUNTER — TELEPHONE (OUTPATIENT)
Age: 60
End: 2025-03-27
Payer: COMMERCIAL

## 2025-03-27 NOTE — TELEPHONE ENCOUNTER
PATIENT'S INS CO REQUIRED A PRIOR AUTH ON  TRELIGY ELIPTA.  IS WAS DENIED.     WILL COVER  ANUITY ELIPTA  DULERA,  FLUTICASONE PROP.   SPIRIVA RESPIMAT.    PLEASE  ADVISE

## 2025-03-28 DIAGNOSIS — J43.2 CENTRILOBULAR EMPHYSEMA (MULTI): Primary | ICD-10-CM

## 2025-03-28 NOTE — TELEPHONE ENCOUNTER
Prescription for dulera 2 puffs twice a day (rinse mouth after use) & Spiriva 2 puffs daily sent to pharmacy to replace Trelegy

## 2025-04-02 ENCOUNTER — APPOINTMENT (OUTPATIENT)
Age: 60
End: 2025-04-02
Payer: COMMERCIAL

## 2025-04-02 VITALS
HEIGHT: 75 IN | OXYGEN SATURATION: 99 % | SYSTOLIC BLOOD PRESSURE: 140 MMHG | WEIGHT: 201.4 LBS | BODY MASS INDEX: 25.04 KG/M2 | HEART RATE: 77 BPM | DIASTOLIC BLOOD PRESSURE: 82 MMHG

## 2025-04-02 DIAGNOSIS — Z12.5 SCREENING FOR PROSTATE CANCER: Primary | ICD-10-CM

## 2025-04-02 DIAGNOSIS — I10 PRIMARY HYPERTENSION: ICD-10-CM

## 2025-04-02 DIAGNOSIS — E78.2 HYPERLIPEMIA, MIXED: ICD-10-CM

## 2025-04-02 PROCEDURE — 3008F BODY MASS INDEX DOCD: CPT | Performed by: NURSE PRACTITIONER

## 2025-04-02 PROCEDURE — 3079F DIAST BP 80-89 MM HG: CPT | Performed by: NURSE PRACTITIONER

## 2025-04-02 PROCEDURE — 3077F SYST BP >= 140 MM HG: CPT | Performed by: NURSE PRACTITIONER

## 2025-04-02 PROCEDURE — 90677 PCV20 VACCINE IM: CPT | Performed by: NURSE PRACTITIONER

## 2025-04-02 PROCEDURE — 99213 OFFICE O/P EST LOW 20 MIN: CPT | Performed by: NURSE PRACTITIONER

## 2025-04-02 PROCEDURE — 90471 IMMUNIZATION ADMIN: CPT | Performed by: NURSE PRACTITIONER

## 2025-04-02 RX ORDER — LISINOPRIL 5 MG/1
5 TABLET ORAL DAILY
Qty: 90 TABLET | Refills: 3 | Status: SHIPPED | OUTPATIENT
Start: 2025-04-02 | End: 2026-04-02

## 2025-04-02 ASSESSMENT — PATIENT HEALTH QUESTIONNAIRE - PHQ9
1. LITTLE INTEREST OR PLEASURE IN DOING THINGS: NOT AT ALL
SUM OF ALL RESPONSES TO PHQ9 QUESTIONS 1 AND 2: 0
2. FEELING DOWN, DEPRESSED OR HOPELESS: NOT AT ALL

## 2025-04-02 ASSESSMENT — ENCOUNTER SYMPTOMS
FEVER: 0
SHORTNESS OF BREATH: 0
PALPITATIONS: 0
DIZZINESS: 0
ARTHRALGIAS: 1
WHEEZING: 0
BACK PAIN: 1
LIGHT-HEADEDNESS: 0
COUGH: 0
HEADACHES: 0
FATIGUE: 0

## 2025-04-02 NOTE — PROGRESS NOTES
"Subjective   Patient ID: Mo Cotton is a 60 y.o. male who presents for Annual Exam (WELLNESS ).    Mo comes to office today  He has since retired & now has Medicaid insurance; Trelegy not covered under this plan.  Changed to Dulera and Spiriva last week.  Recommend Prevnar 20 vaccine  Not checking BP consistently @ home 130's/80's since starting lisinopril.  Taking medication and tolerating without incident. Ck labs  Has cut back on cigarette consumption, only smokes when drinks (weekends)  Colonoscopy 2024; repeat 5 years  Carotid ultrasound 2023 showed right ECA, recommend rechecking carotids. Pt wishes to hold off @ this time due to cost & \"still paying off bills\". Continue statin, encouraged to quit smoking, and maintain stringent blood pressure control           Review of Systems   Constitutional:  Negative for fatigue and fever.   Respiratory:  Negative for cough, shortness of breath and wheezing.    Cardiovascular:  Negative for chest pain, palpitations and leg swelling.   Musculoskeletal:  Positive for arthralgias and back pain.        Bilateral hand pain & L medial elbow   Neurological:  Negative for dizziness, light-headedness and headaches.       Objective   /82   Pulse 77   Ht 1.905 m (6' 3\")   Wt 91.4 kg (201 lb 6.4 oz)   SpO2 99%   BMI 25.17 kg/m²     Physical Exam  Vitals reviewed.   Constitutional:       Appearance: Normal appearance.   Neck:      Vascular: Carotid bruit present.   Cardiovascular:      Rate and Rhythm: Normal rate and regular rhythm.      Heart sounds: Normal heart sounds.   Pulmonary:      Effort: Pulmonary effort is normal.      Breath sounds: Decreased air movement present.   Musculoskeletal:      Right lower leg: No edema.      Left lower leg: No edema.   Skin:     General: Skin is warm and dry.      Capillary Refill: Capillary refill takes less than 2 seconds.   Neurological:      Mental Status: He is alert.         Assessment/Plan   Problem List Items Addressed " This Visit             ICD-10-CM    Hyperlipemia, mixed E78.2    Relevant Orders    Lipid Panel    Comprehensive Metabolic Panel    Follow Up In Primary Care - Established    Screening for prostate cancer - Primary Z12.5    Relevant Orders    PSA    Follow Up In Primary Care - Established    Primary hypertension I10    Relevant Medications    lisinopril 5 mg tablet    Other Relevant Orders    Comprehensive Metabolic Panel    Follow Up In Primary Care - Established

## 2025-04-02 NOTE — PATIENT INSTRUCTIONS
Office visit in 6 months.  Please complete your blood work and we will notify you when those results are available. If you have been asked to fast for your upcoming bloodwork please don't eat or drink 12 hours prior to your blood work. You may have water, black coffee or tea without creamer, milk or sugar.   You received your Prevnar 20 vaccine today  Lisinopril refilled; monitor blood pressure at home. Call if BP above 140/90  Try to completely quit smoking

## 2025-04-08 LAB
ALBUMIN SERPL-MCNC: 4.2 G/DL (ref 3.6–5.1)
ALP SERPL-CCNC: 108 U/L (ref 35–144)
ALT SERPL-CCNC: 44 U/L (ref 9–46)
ANION GAP SERPL CALCULATED.4IONS-SCNC: 10 MMOL/L (CALC) (ref 7–17)
AST SERPL-CCNC: 19 U/L (ref 10–35)
BILIRUB SERPL-MCNC: 0.6 MG/DL (ref 0.2–1.2)
BUN SERPL-MCNC: 10 MG/DL (ref 7–25)
CALCIUM SERPL-MCNC: 9.6 MG/DL (ref 8.6–10.3)
CHLORIDE SERPL-SCNC: 104 MMOL/L (ref 98–110)
CHOLEST SERPL-MCNC: 186 MG/DL
CHOLEST/HDLC SERPL: 3.5 (CALC)
CO2 SERPL-SCNC: 26 MMOL/L (ref 20–32)
CREAT SERPL-MCNC: 0.76 MG/DL (ref 0.7–1.35)
EGFRCR SERPLBLD CKD-EPI 2021: 102 ML/MIN/1.73M2
GLUCOSE SERPL-MCNC: 88 MG/DL (ref 65–99)
HDLC SERPL-MCNC: 53 MG/DL
LDLC SERPL CALC-MCNC: 103 MG/DL (CALC)
NONHDLC SERPL-MCNC: 133 MG/DL (CALC)
POTASSIUM SERPL-SCNC: 4.3 MMOL/L (ref 3.5–5.3)
PROT SERPL-MCNC: 6.6 G/DL (ref 6.1–8.1)
PSA SERPL-MCNC: 0.6 NG/ML
SODIUM SERPL-SCNC: 140 MMOL/L (ref 135–146)
TRIGL SERPL-MCNC: 181 MG/DL

## 2025-04-09 ENCOUNTER — TELEPHONE (OUTPATIENT)
Age: 60
End: 2025-04-09
Payer: COMMERCIAL

## 2025-04-09 NOTE — TELEPHONE ENCOUNTER
----- Message from Meg Vital sent at 4/8/2025  8:48 PM EDT -----  Notify pt overall blood work looks good; a mildly elevated cholesterol but improved since last year. Continue on lipitor  PSA normal  Kidney & liver testing normal; no diabetes

## 2025-10-02 ENCOUNTER — APPOINTMENT (OUTPATIENT)
Age: 60
End: 2025-10-02
Payer: COMMERCIAL